# Patient Record
Sex: MALE | Race: WHITE | NOT HISPANIC OR LATINO | Employment: UNEMPLOYED | URBAN - METROPOLITAN AREA
[De-identification: names, ages, dates, MRNs, and addresses within clinical notes are randomized per-mention and may not be internally consistent; named-entity substitution may affect disease eponyms.]

---

## 2017-04-03 RX ORDER — CLOPIDOGREL BISULFATE 75 MG/1
TABLET ORAL
Qty: 30 TABLET | Refills: 2 | OUTPATIENT
Start: 2017-04-03

## 2017-04-03 RX ORDER — ATORVASTATIN CALCIUM 40 MG/1
TABLET, FILM COATED ORAL
Qty: 30 TABLET | Refills: 2 | OUTPATIENT
Start: 2017-04-03

## 2017-04-11 ENCOUNTER — OFFICE VISIT (OUTPATIENT)
Dept: CARDIOLOGY | Facility: CLINIC | Age: 72
End: 2017-04-11

## 2017-04-11 VITALS
WEIGHT: 240 LBS | SYSTOLIC BLOOD PRESSURE: 137 MMHG | BODY MASS INDEX: 32.55 KG/M2 | HEART RATE: 76 BPM | DIASTOLIC BLOOD PRESSURE: 94 MMHG

## 2017-04-11 DIAGNOSIS — R94.31 ABNORMAL ECG: ICD-10-CM

## 2017-04-11 DIAGNOSIS — I25.10 ARTERIOSCLEROSIS OF CORONARY ARTERY: Primary | ICD-10-CM

## 2017-04-11 PROCEDURE — 93000 ELECTROCARDIOGRAM COMPLETE: CPT | Performed by: INTERNAL MEDICINE

## 2017-04-11 PROCEDURE — 99214 OFFICE O/P EST MOD 30 MIN: CPT | Performed by: INTERNAL MEDICINE

## 2017-04-11 RX ORDER — FUROSEMIDE 20 MG/1
20 TABLET ORAL DAILY
Qty: 60 TABLET | Refills: 1 | Status: SHIPPED | OUTPATIENT
Start: 2017-04-11 | End: 2017-08-09 | Stop reason: SDUPTHER

## 2017-04-11 RX ORDER — ATORVASTATIN CALCIUM 40 MG/1
40 TABLET, FILM COATED ORAL DAILY
Qty: 30 TABLET | Refills: 3 | Status: SHIPPED | OUTPATIENT
Start: 2017-04-11 | End: 2017-08-09 | Stop reason: SDUPTHER

## 2017-04-11 RX ORDER — ATORVASTATIN CALCIUM 40 MG/1
TABLET, FILM COATED ORAL
COMMUNITY
Start: 2015-09-08 | End: 2017-04-11

## 2017-04-11 RX ORDER — CLOPIDOGREL BISULFATE 75 MG/1
TABLET ORAL
COMMUNITY
Start: 2014-02-21 | End: 2017-04-11

## 2017-04-11 RX ORDER — LOSARTAN POTASSIUM 50 MG/1
50 TABLET ORAL DAILY
Qty: 60 TABLET | Refills: 2 | Status: SHIPPED | OUTPATIENT
Start: 2017-04-11 | End: 2017-11-10 | Stop reason: SDUPTHER

## 2017-04-11 RX ORDER — CLOPIDOGREL BISULFATE 75 MG/1
75 TABLET ORAL DAILY
Qty: 30 TABLET | Refills: 3 | Status: SHIPPED | OUTPATIENT
Start: 2017-04-11 | End: 2017-08-09 | Stop reason: SDUPTHER

## 2017-04-11 RX ORDER — RAMIPRIL 5 MG/1
CAPSULE ORAL DAILY
COMMUNITY
Start: 2014-03-31 | End: 2017-04-11

## 2017-04-11 NOTE — PROGRESS NOTES
Procedure   [facility]  Cardiology Progress Note    Patient Identification:  Name:Don Verde  Age:72 y.o.  Sex: male  :  1945  MRN: [id number]           2017    Subjective:    Chief Complaint   Patient presents with   • Abnormal ECG       HPI     Mr. Verde is a 72-year-old white male with history of coronary artery disease, underwent a Cardiolite stress test that was equivocal; subsequently underwent cardiac catheterization 2014 showed a long stenosis proximal to mid LAD and underwent a drug-eluting stent implant 2.5 cm Ã--38 cm; also history for diabetes, hypertension, who presents for cardiac follow-up.      Since then, no exertional chest pain. He is physically active. He can walk upto a mile No associated nausea, diaphoresis, radiation or shortness of breath. It was recommended to continue aspirin and Plavix for 1 year.      normal left ventricular size and function. Diastolic dysfunction is present.  He does have some mental disorder due to autism     No new symptoms since the last office visit. He can walk a mile. He does have what appears to be lymphedema of chronic excoriation and thickening of skin in the legs. He doesn't wear compression stockings. I did ask him to follow up with Dr. Barker. No angina.    Review of Systems   Constitution: Negative for chills, fever and malaise/fatigue.   HENT: Negative for headaches.    Eyes: Negative for blurred vision and double vision.   Cardiovascular: Positive for chest pain and leg swelling. Negative for irregular heartbeat and palpitations.   Respiratory: Negative for shortness of breath and snoring.    Skin: Positive for color change.   Musculoskeletal: Positive for arthritis and joint pain.   Gastrointestinal: Positive for vomiting (couple weeks). Negative for abdominal pain and nausea.   Neurological: Positive for light-headedness (if standing to fast). Negative for dizziness.   Psychiatric/Behavioral: Negative for  depression.       The following portions of the patient's history were reviewed and updated as appropriate: allergies, current medications, past family history, past medical history, past social history,and problem list.    Past Medical History:   Diagnosis Date   • Abnormal ECG        Past Surgical History:   Procedure Laterality Date   • APPENDECTOMY     • VEIN SURGERY         Social History     Social History   • Marital status: Single     Spouse name: N/A   • Number of children: N/A   • Years of education: N/A     Occupational History   • Not on file.     Social History Main Topics   • Smoking status: Never Smoker   • Smokeless tobacco: Not on file   • Alcohol use No   • Drug use: Not on file   • Sexual activity: Not on file     Other Topics Concern   • Not on file     Social History Narrative   • No narrative on file       Family History   Problem Relation Age of Onset   • Hypertension Mother    • Heart disease Mother    • Hypertension Father    • Heart disease Father    • Hypertension Maternal Aunt    • Hypertension Maternal Uncle    • Hypertension Paternal Aunt    • Hypertension Paternal Uncle        Scheduled Meds:    Current Outpatient Prescriptions:   •  aspirin 81 MG tablet, Take  by mouth Daily., Disp: , Rfl:   •  atorvastatin (LIPITOR) 40 MG tablet, Take 1 tablet by mouth Daily., Disp: 30 tablet, Rfl: 3  •  clopidogrel (PLAVIX) 75 MG tablet, Take 1 tablet by mouth Daily., Disp: 30 tablet, Rfl: 3    Objective:  /94  Pulse 76  Wt 240 lb (109 kg)  BMI 32.55 kg/m2     Physical Exam  Physical Exam:    General: No acute distress. Poor self care   Skin: Warm and dry, no diaphoresis noted   HEENT: No ptosis; external ear and nose normal; oral mucosa moist   Neck: Supple; no carotid bruits; no JVD, Trachea mid line   Heart: S1S2 regular rate and rhythm; soft systolic murmurs; no gallop or rub appreciated, apex not displaced   Chest: Respirations regular, unlabored at rest, bilateral breath sounds have  good air entry; no  wheezes auscultated.     Abdomen: Soft, non-tender, non-distended, positive bowel sounds  No hepatosplenomegaly   Extremities: Bilateral lower extremities have  pre-tibial pitting edema; lymph edema, left leg is the worst one,Radials are palpable   Neurological: Alert and oriented x 3; no new motor deficits,           ECG 12 Lead  Date/Time: 4/11/2017 2:54 PM  Performed by: ALEXA HINTON  Authorized by: ALEXA HINTON   Rhythm: sinus rhythm  Rate: normal  QRS axis: left             Comparison to previous ECG:  Similar to previous ecg     Assessment:  Problem List Items Addressed This Visit        Cardiovascular and Mediastinum    Arteriosclerosis of coronary artery - Primary    Overview     Description: February 2013, long stenosis proximal to mid left anterior descending artery with a 2.5 cm x38 cm drug-eluting stent.            Other    Abnormal ECG          Plan:    Overall clinically he is doing OK. No angina. His BP is mildly high. I will add a small dose of Lasix and altace. I did tell him to follow up with Dr chou otherwise he may loose his leg.  I did ask him to see his family doctor to have kidney function checked in a month.  I added a small dose of Lasix and Losartan.    I not only counseled the patient today on the risk factor modification of significant factors noted in the assessment and plan, and I also recommended that the patient reduce salt and saturated animal fat intake in diet, about the advantages of plant based diet, as well as to perform scheduled exercise on a regular basis.    04/11/2017  Jaime Hinton MD, FACC

## 2017-08-10 RX ORDER — CLOPIDOGREL BISULFATE 75 MG/1
TABLET ORAL
Qty: 30 TABLET | Refills: 2 | Status: SHIPPED | OUTPATIENT
Start: 2017-08-10 | End: 2017-11-10 | Stop reason: SDUPTHER

## 2017-08-10 RX ORDER — ATORVASTATIN CALCIUM 40 MG/1
TABLET, FILM COATED ORAL
Qty: 30 TABLET | Refills: 2 | Status: SHIPPED | OUTPATIENT
Start: 2017-08-10 | End: 2017-11-10 | Stop reason: SDUPTHER

## 2017-08-10 RX ORDER — FUROSEMIDE 20 MG/1
TABLET ORAL
Qty: 60 TABLET | Refills: 2 | Status: SHIPPED | OUTPATIENT
Start: 2017-08-10 | End: 2018-03-19 | Stop reason: SDUPTHER

## 2017-11-10 RX ORDER — ATORVASTATIN CALCIUM 40 MG/1
40 TABLET, FILM COATED ORAL DAILY
Qty: 30 TABLET | Refills: 3 | Status: SHIPPED | OUTPATIENT
Start: 2017-11-10 | End: 2018-02-13 | Stop reason: SDUPTHER

## 2017-11-10 RX ORDER — CLOPIDOGREL BISULFATE 75 MG/1
75 TABLET ORAL DAILY
Qty: 30 TABLET | Refills: 3 | Status: SHIPPED | OUTPATIENT
Start: 2017-11-10 | End: 2018-03-19 | Stop reason: SDUPTHER

## 2017-11-10 RX ORDER — LOSARTAN POTASSIUM 50 MG/1
50 TABLET ORAL DAILY
Qty: 60 TABLET | Refills: 3 | Status: SHIPPED | OUTPATIENT
Start: 2017-11-10 | End: 2018-04-17 | Stop reason: ALTCHOICE

## 2018-02-13 RX ORDER — ATORVASTATIN CALCIUM 40 MG/1
TABLET, FILM COATED ORAL
Qty: 30 TABLET | Refills: 0 | Status: SHIPPED | OUTPATIENT
Start: 2018-02-13 | End: 2018-03-19 | Stop reason: SDUPTHER

## 2018-03-19 RX ORDER — CLOPIDOGREL BISULFATE 75 MG/1
75 TABLET ORAL DAILY
Qty: 30 TABLET | Refills: 3 | OUTPATIENT
Start: 2018-03-19

## 2018-03-19 RX ORDER — ATORVASTATIN CALCIUM 40 MG/1
TABLET, FILM COATED ORAL
Qty: 30 TABLET | Refills: 0 | OUTPATIENT
Start: 2018-03-19

## 2018-03-19 RX ORDER — FUROSEMIDE 20 MG/1
20 TABLET ORAL DAILY
Qty: 30 TABLET | Refills: 0 | Status: SHIPPED | OUTPATIENT
Start: 2018-03-19 | End: 2018-04-27 | Stop reason: SDUPTHER

## 2018-03-19 RX ORDER — CLOPIDOGREL BISULFATE 75 MG/1
75 TABLET ORAL DAILY
Qty: 30 TABLET | Refills: 0 | Status: SHIPPED | OUTPATIENT
Start: 2018-03-19 | End: 2018-04-17 | Stop reason: ALTCHOICE

## 2018-03-19 RX ORDER — ATORVASTATIN CALCIUM 40 MG/1
40 TABLET, FILM COATED ORAL DAILY
Qty: 30 TABLET | Refills: 0 | Status: SHIPPED | OUTPATIENT
Start: 2018-03-19 | End: 2018-03-31 | Stop reason: SDUPTHER

## 2018-04-02 RX ORDER — ATORVASTATIN CALCIUM 40 MG/1
40 TABLET, FILM COATED ORAL DAILY
Qty: 30 TABLET | Refills: 0 | Status: SHIPPED | OUTPATIENT
Start: 2018-04-02 | End: 2018-04-17

## 2018-04-02 RX ORDER — ATORVASTATIN CALCIUM 40 MG/1
40 TABLET, FILM COATED ORAL DAILY
Qty: 30 TABLET | Refills: 0 | Status: SHIPPED | OUTPATIENT
Start: 2018-04-02 | End: 2018-04-17 | Stop reason: SDUPTHER

## 2018-04-17 ENCOUNTER — OFFICE VISIT (OUTPATIENT)
Dept: CARDIOLOGY | Facility: CLINIC | Age: 73
End: 2018-04-17

## 2018-04-17 VITALS
BODY MASS INDEX: 32.62 KG/M2 | OXYGEN SATURATION: 93 % | HEIGHT: 71 IN | HEART RATE: 87 BPM | SYSTOLIC BLOOD PRESSURE: 161 MMHG | DIASTOLIC BLOOD PRESSURE: 96 MMHG | WEIGHT: 233 LBS

## 2018-04-17 DIAGNOSIS — I25.10 ARTERIOSCLEROSIS OF CORONARY ARTERY: ICD-10-CM

## 2018-04-17 DIAGNOSIS — I10 ESSENTIAL HYPERTENSION: Primary | ICD-10-CM

## 2018-04-17 DIAGNOSIS — R06.02 SHORTNESS OF BREATH: ICD-10-CM

## 2018-04-17 PROCEDURE — 99214 OFFICE O/P EST MOD 30 MIN: CPT | Performed by: INTERNAL MEDICINE

## 2018-04-17 PROCEDURE — 93000 ELECTROCARDIOGRAM COMPLETE: CPT | Performed by: INTERNAL MEDICINE

## 2018-04-17 RX ORDER — AMLODIPINE BESYLATE 5 MG/1
5 TABLET ORAL DAILY
Qty: 90 TABLET | Refills: 3 | Status: SHIPPED | OUTPATIENT
Start: 2018-04-17

## 2018-04-17 RX ORDER — AMLODIPINE BESYLATE 5 MG/1
5 TABLET ORAL DAILY
Qty: 30 TABLET | Refills: 11 | Status: SHIPPED | OUTPATIENT
Start: 2018-04-17 | End: 2018-04-17 | Stop reason: SDUPTHER

## 2018-04-17 NOTE — PROGRESS NOTES
Subjective:       Don Verde is a 73 y.o. male who here for follow up    CC  The follow-up for the coronary artery disease as well as a hypertension  HPI  73-year-old male with known history of coronary atherosclerosis, hypertension, as well as shortness of breath here for the follow-up has a blood pressure running high    Don Verde Jr. has been complaining of the shortness of breath mild-to-moderate in intensity with mild-to-moderate usually relieved with rest associated with anxiety and fatigue       Problem List Items Addressed This Visit        Cardiovascular and Mediastinum    Arteriosclerosis of coronary artery    Essential hypertension - Primary    Relevant Orders    Stress Test With Myocardial Perfusion One Day    Adult Transthoracic Echo Complete W/ Cont if Necessary Per Protocol       Respiratory    Shortness of breath     Relevant Orders    Stress Test With Myocardial Perfusion One Day      Other Visit Diagnoses    None.       .    The following portions of the patient's history were reviewed and updated as appropriate: allergies, current medications, past family history, past medical history, past social history, past surgical history and problem list.    Past Medical History:   Diagnosis Date   • Abnormal ECG     reports that he has never smoked. He does not have any smokeless tobacco history on file. He reports that he does not drink alcohol.  Family History   Problem Relation Age of Onset   • Hypertension Mother    • Heart disease Mother    • Hypertension Father    • Heart disease Father    • Hypertension Maternal Aunt    • Hypertension Maternal Uncle    • Hypertension Paternal Aunt    • Hypertension Paternal Uncle        Review of Systems  Constitutional: No wt loss, fever, fatigue  Gastrointestinal: No nausea, abdominal pain  Behavioral/Psych: No insomnia or anxiety   Cardiovascular No chest pains or tightness in chest  Objective:       Physical Exam           Physical Exam  /96 (BP  "Location: Left arm, Patient Position: Sitting)   Pulse 87   Ht 180.3 cm (71\")   Wt 106 kg (233 lb)   SpO2 93%   BMI 32.50 kg/m²     General appearance: NAD, conversant   Eyes: anicteric sclerae, moist conjunctivae; no lid-lag; PERRLA   HENT: Atraumatic; oropharynx clear with moist mucous membranes and no mucosal ulcerations;  normal hard and soft palate   Neck: Trachea midline; FROM, supple, no thyromegaly or lymphadenopathy   Lungs: CTA, with normal respiratory effort and no intercostal retractions   CV: S1-S2 regular, no murmurs, no rub, no gallop   Abdomen: Soft, non-tender; no masses or HSM   Extremities: No peripheral edema or extremity lymphadenopathy  Skin: Normal temperature, turgor and texture; no rash, ulcers or subcutaneous nodules   Psych: Appropriate affect, alert and oriented to person, place and time           Cardiographics  @  ECG 12 Lead  Date/Time: 4/17/2018 12:11 PM  Performed by: LORETTA SUAREZ  Authorized by: LORETTA SUAREZ   Comparison: compared with previous ECG   Similar to previous ECG  Rhythm: sinus rhythm  ST Flattening: all  Clinical impression: non-specific ECG            Echocardiogram:        Current Outpatient Prescriptions:   •  aspirin 81 MG tablet, Take  by mouth Daily., Disp: , Rfl:   •  atorvastatin (LIPITOR) 40 MG tablet, TAKE 1 TABLET BY MOUTH DAILY., Disp: 30 tablet, Rfl: 0  •  clopidogrel (PLAVIX) 75 MG tablet, Take 1 tablet by mouth Daily., Disp: 30 tablet, Rfl: 0  •  furosemide (LASIX) 20 MG tablet, Take 1 tablet by mouth Daily., Disp: 30 tablet, Rfl: 0   Assessment:        Patient Active Problem List   Diagnosis   • Abnormal ECG   • Arteriosclerosis of coronary artery               Plan:            ICD-10-CM ICD-9-CM   1. Essential hypertension I10 401.9   2. Shortness of breath  R06.02 786.05   3. Arteriosclerosis of coronary artery I25.10 414.00     1. Essential hypertension  Considering the patient's symptoms as well as clinical situation and  EKG " findings, along with cardiac risk factors, ischemic workup is necessary to rule out ischemic cardiomyopathy, stress induced arrhythmias, and functional capacity for diagnosis as well as prognostic consideration    - Stress Test With Myocardial Perfusion One Day  - Adult Transthoracic Echo Complete W/ Cont if Necessary Per Protocol    2. Shortness of breath   Considering the patient's symptoms as well as clinical situation and  EKG findings, along with cardiac risk factors, ischemic workup is necessary to rule out ischemic cardiomyopathy, stress induced arrhythmias, and functional capacity for diagnosis as well as prognostic consideration    Considering patient's medical condition as well as the risk factors, patient will require echocardiogram for further evaluation for the LV function, four-chamber evaluation, including the pressures, valvular function and  pericardial disease and pericardial effusion    - Stress Test With Myocardial Perfusion One Day    3. Arteriosclerosis of coronary artery  Shortness of breath could be angina equivalent     DC PLAVIX    NORVASC  5 MG PO DAILY  Pros and cons of this new medication / change medication has been explained to  the patient    Possible side effects has been explained    Associated need of the blood  Work has been explained    Need for the compliance of the medication has been explained      WALKING LEXISCAN / ECHO    SEE US 2-3 WKS  COUNSELING:    Don Fajardo was given to patient for the following topics: diagnostic results, risk factor reductions, impressions, risks and benefits of treatment options and importance of treatment compliance .       SMOKING COUNSELING:    Counseling given: Not Answered      EMR Dragon/Transcription disclaimer:   Much of this encounter note is an electronic transcription/translation of spoken language to printed text. The electronic translation of spoken language may permit erroneous, or at times, nonsensical words or phrases  to be inadvertently transcribed; Although I have reviewed the note for such errors, some may still exist.

## 2018-04-18 PROBLEM — I10 ESSENTIAL HYPERTENSION: Status: ACTIVE | Noted: 2018-04-18

## 2018-04-18 PROBLEM — R06.02 SHORTNESS OF BREATH: Status: ACTIVE | Noted: 2018-04-18

## 2018-04-18 RX ORDER — ATORVASTATIN CALCIUM 40 MG/1
40 TABLET, FILM COATED ORAL DAILY
Qty: 90 TABLET | Refills: 0 | Status: SHIPPED | OUTPATIENT
Start: 2018-04-18 | End: 2018-05-30

## 2018-04-27 RX ORDER — FUROSEMIDE 20 MG/1
20 TABLET ORAL DAILY
Qty: 30 TABLET | Refills: 0 | Status: SHIPPED | OUTPATIENT
Start: 2018-04-27 | End: 2018-05-30

## 2018-04-27 RX ORDER — CLOPIDOGREL BISULFATE 75 MG/1
75 TABLET ORAL DAILY
Qty: 30 TABLET | Refills: 0 | Status: SHIPPED | OUTPATIENT
Start: 2018-04-27 | End: 2018-06-06 | Stop reason: SDUPTHER

## 2018-05-10 DIAGNOSIS — R06.02 SHORTNESS OF BREATH: Primary | ICD-10-CM

## 2018-05-10 DIAGNOSIS — R94.31 ABNORMAL EKG: ICD-10-CM

## 2018-05-14 ENCOUNTER — APPOINTMENT (OUTPATIENT)
Dept: CARDIOLOGY | Facility: HOSPITAL | Age: 73
End: 2018-05-14
Attending: INTERNAL MEDICINE

## 2018-05-30 ENCOUNTER — HOSPITAL ENCOUNTER (INPATIENT)
Facility: HOSPITAL | Age: 73
LOS: 5 days | Discharge: HOME | End: 2018-06-04
Attending: EMERGENCY MEDICINE | Admitting: HOSPITALIST

## 2018-05-30 ENCOUNTER — APPOINTMENT (OUTPATIENT)
Dept: GENERAL RADIOLOGY | Facility: HOSPITAL | Age: 73
End: 2018-05-30

## 2018-05-30 DIAGNOSIS — L03.116 LEFT LEG CELLULITIS: ICD-10-CM

## 2018-05-30 DIAGNOSIS — Z74.1 SELF-CARE DEFICIT FOR HYGIENE: ICD-10-CM

## 2018-05-30 DIAGNOSIS — F84.5 ASPERGER SYNDROME: ICD-10-CM

## 2018-05-30 DIAGNOSIS — S81.802A OPEN WOUND OF LEFT LOWER LEG, INITIAL ENCOUNTER: Primary | ICD-10-CM

## 2018-05-30 PROBLEM — E11.9 DIABETES MELLITUS (HCC): Chronic | Status: ACTIVE | Noted: 2018-05-30

## 2018-05-30 PROBLEM — L85.9 HYPERKERATOSIS OF SKIN: Chronic | Status: ACTIVE | Noted: 2018-05-30

## 2018-05-30 PROBLEM — I87.2 VENOUS INSUFFICIENCY OF BOTH LOWER EXTREMITIES: Chronic | Status: ACTIVE | Noted: 2018-05-30

## 2018-05-30 LAB
ALBUMIN SERPL-MCNC: 4.1 G/DL (ref 3.5–5.2)
ALBUMIN/GLOB SERPL: 1.3 G/DL
ALP SERPL-CCNC: 106 U/L (ref 39–117)
ALT SERPL W P-5'-P-CCNC: 18 U/L (ref 1–41)
ANION GAP SERPL CALCULATED.3IONS-SCNC: 15.5 MMOL/L
AST SERPL-CCNC: 17 U/L (ref 1–40)
BASOPHILS # BLD AUTO: 0.05 10*3/MM3 (ref 0–0.2)
BASOPHILS NFR BLD AUTO: 0.6 % (ref 0–1.5)
BH CV XLRA MEAS - DIST GSV CALF DIST LEFT: 0.3 CM
BH CV XLRA MEAS - DIST GSV CALF DIST RIGHT: 0.43 CM
BH CV XLRA MEAS - DIST GSV THIGH DIST LEFT: 0.3 CM
BH CV XLRA MEAS - DIST GSV THIGH DIST RIGHT: 0.55 CM
BH CV XLRA MEAS - DIST LSV CALF DIST LEFT: 0.39 CM
BH CV XLRA MEAS - DIST LSV CALF DIST RIGHT: 0.29 CM
BH CV XLRA MEAS - GSV ANKLE DIST LEFT: 0.39 CM
BH CV XLRA MEAS - GSV ANKLE DIST RIGHT: 0.38 CM
BH CV XLRA MEAS - GSV KNEE DIST RIGHT: 0.55 CM
BH CV XLRA MEAS - GSV ORIGIN DIST LEFT: 0.91 CM
BH CV XLRA MEAS - GSV ORIGIN DIST RIGHT: 0.69 CM
BH CV XLRA MEAS - MID GSV CALF LEFT: 0.53 CM
BH CV XLRA MEAS - MID GSV CALF RIGHT: 0.44 CM
BH CV XLRA MEAS - MID GSV THIGH  LEFT: 0.63 CM
BH CV XLRA MEAS - MID GSV THIGH  RIGHT: 0.8 CM
BH CV XLRA MEAS - MID LSV CALF DIST LEFT: 0.46 CM
BH CV XLRA MEAS - MID LSV CALF DIST RIGHT: 0.25 CM
BH CV XLRA MEAS - PROX GSV CALF DIST LEFT: 0.46 CM
BH CV XLRA MEAS - PROX GSV CALF DIST RIGHT: 0.51 CM
BH CV XLRA MEAS - PROX GSV THIGH  LEFT: 1.1 CM
BH CV XLRA MEAS - PROX GSV THIGH  RIGHT: 1.4 CM
BH CV XLRA MEAS - PROX LSV CALF DIST LEFT: 0.39 CM
BH CV XLRA MEAS - PROX LSV CALF DIST RIGHT: 0.48 CM
BILIRUB SERPL-MCNC: 0.6 MG/DL (ref 0.1–1.2)
BUN BLD-MCNC: 15 MG/DL (ref 8–23)
BUN/CREAT SERPL: 13.5 (ref 7–25)
CALCIUM SPEC-SCNC: 9.2 MG/DL (ref 8.6–10.5)
CHLORIDE SERPL-SCNC: 99 MMOL/L (ref 98–107)
CO2 SERPL-SCNC: 23.5 MMOL/L (ref 22–29)
CREAT BLD-MCNC: 1.11 MG/DL (ref 0.76–1.27)
D-LACTATE SERPL-SCNC: 1.8 MMOL/L (ref 0.5–2)
D-LACTATE SERPL-SCNC: 2.4 MMOL/L (ref 0.5–2)
DEPRECATED RDW RBC AUTO: 43.1 FL (ref 37–54)
EOSINOPHIL # BLD AUTO: 0.3 10*3/MM3 (ref 0–0.7)
EOSINOPHIL NFR BLD AUTO: 3.3 % (ref 0.3–6.2)
ERYTHROCYTE [DISTWIDTH] IN BLOOD BY AUTOMATED COUNT: 13.8 % (ref 11.5–14.5)
GFR SERPL CREATININE-BSD FRML MDRD: 65 ML/MIN/1.73
GLOBULIN UR ELPH-MCNC: 3.2 GM/DL
GLUCOSE BLD-MCNC: 152 MG/DL (ref 65–99)
GLUCOSE BLDC GLUCOMTR-MCNC: 136 MG/DL (ref 70–130)
GLUCOSE BLDC GLUCOMTR-MCNC: 160 MG/DL (ref 70–130)
GLUCOSE BLDC GLUCOMTR-MCNC: 168 MG/DL (ref 70–130)
HCT VFR BLD AUTO: 44.4 % (ref 40.4–52.2)
HGB BLD-MCNC: 14.5 G/DL (ref 13.7–17.6)
HOLD SPECIMEN: NORMAL
IMM GRANULOCYTES # BLD: 0.01 10*3/MM3 (ref 0–0.03)
IMM GRANULOCYTES NFR BLD: 0.1 % (ref 0–0.5)
INR PPP: 1.04 (ref 0.9–1.1)
LYMPHOCYTES # BLD AUTO: 1.42 10*3/MM3 (ref 0.9–4.8)
LYMPHOCYTES NFR BLD AUTO: 15.8 % (ref 19.6–45.3)
MCH RBC QN AUTO: 28 PG (ref 27–32.7)
MCHC RBC AUTO-ENTMCNC: 32.7 G/DL (ref 32.6–36.4)
MCV RBC AUTO: 85.7 FL (ref 79.8–96.2)
MONOCYTES # BLD AUTO: 0.68 10*3/MM3 (ref 0.2–1.2)
MONOCYTES NFR BLD AUTO: 7.6 % (ref 5–12)
NEUTROPHILS # BLD AUTO: 6.53 10*3/MM3 (ref 1.9–8.1)
NEUTROPHILS NFR BLD AUTO: 72.7 % (ref 42.7–76)
NRBC BLD MANUAL-RTO: 0 /100 WBC (ref 0–0)
NT-PROBNP SERPL-MCNC: 131.6 PG/ML (ref 0–900)
PLATELET # BLD AUTO: 385 10*3/MM3 (ref 140–500)
PMV BLD AUTO: 9.9 FL (ref 6–12)
POTASSIUM BLD-SCNC: 4.1 MMOL/L (ref 3.5–5.2)
PROCALCITONIN SERPL-MCNC: 0.02 NG/ML (ref 0.1–0.25)
PROT SERPL-MCNC: 7.3 G/DL (ref 6–8.5)
PROTHROMBIN TIME: 13.4 SECONDS (ref 11.7–14.2)
RBC # BLD AUTO: 5.18 10*6/MM3 (ref 4.6–6)
SODIUM BLD-SCNC: 138 MMOL/L (ref 136–145)
TROPONIN T SERPL-MCNC: <0.01 NG/ML (ref 0–0.03)
WBC NRBC COR # BLD: 8.98 10*3/MM3 (ref 4.5–10.7)

## 2018-05-30 PROCEDURE — 83605 ASSAY OF LACTIC ACID: CPT | Performed by: NURSE PRACTITIONER

## 2018-05-30 PROCEDURE — 84145 PROCALCITONIN (PCT): CPT | Performed by: NURSE PRACTITIONER

## 2018-05-30 PROCEDURE — 25010000002 PIPERACILLIN SOD-TAZOBACTAM PER 1 G: Performed by: NURSE PRACTITIONER

## 2018-05-30 PROCEDURE — 87040 BLOOD CULTURE FOR BACTERIA: CPT | Performed by: NURSE PRACTITIONER

## 2018-05-30 PROCEDURE — 36415 COLL VENOUS BLD VENIPUNCTURE: CPT

## 2018-05-30 PROCEDURE — 36415 COLL VENOUS BLD VENIPUNCTURE: CPT | Performed by: NURSE PRACTITIONER

## 2018-05-30 PROCEDURE — 93005 ELECTROCARDIOGRAM TRACING: CPT | Performed by: NURSE PRACTITIONER

## 2018-05-30 PROCEDURE — 82962 GLUCOSE BLOOD TEST: CPT

## 2018-05-30 PROCEDURE — 25010000002 VANCOMYCIN: Performed by: NURSE PRACTITIONER

## 2018-05-30 PROCEDURE — 80053 COMPREHEN METABOLIC PANEL: CPT | Performed by: NURSE PRACTITIONER

## 2018-05-30 PROCEDURE — 99285 EMERGENCY DEPT VISIT HI MDM: CPT

## 2018-05-30 PROCEDURE — 85610 PROTHROMBIN TIME: CPT | Performed by: NURSE PRACTITIONER

## 2018-05-30 PROCEDURE — 85025 COMPLETE CBC W/AUTO DIFF WBC: CPT | Performed by: NURSE PRACTITIONER

## 2018-05-30 PROCEDURE — 93010 ELECTROCARDIOGRAM REPORT: CPT | Performed by: INTERNAL MEDICINE

## 2018-05-30 PROCEDURE — 83880 ASSAY OF NATRIURETIC PEPTIDE: CPT | Performed by: NURSE PRACTITIONER

## 2018-05-30 PROCEDURE — 71046 X-RAY EXAM CHEST 2 VIEWS: CPT

## 2018-05-30 PROCEDURE — 84484 ASSAY OF TROPONIN QUANT: CPT | Performed by: NURSE PRACTITIONER

## 2018-05-30 RX ORDER — NALOXONE HCL 0.4 MG/ML
0.4 VIAL (ML) INJECTION
Status: DISCONTINUED | OUTPATIENT
Start: 2018-05-30 | End: 2018-06-04 | Stop reason: HOSPADM

## 2018-05-30 RX ORDER — SODIUM CHLORIDE 0.9 % (FLUSH) 0.9 %
1-10 SYRINGE (ML) INJECTION AS NEEDED
Status: DISCONTINUED | OUTPATIENT
Start: 2018-05-30 | End: 2018-06-04 | Stop reason: HOSPADM

## 2018-05-30 RX ORDER — ACETAMINOPHEN 325 MG/1
650 TABLET ORAL EVERY 6 HOURS PRN
Status: DISCONTINUED | OUTPATIENT
Start: 2018-05-30 | End: 2018-06-04 | Stop reason: HOSPADM

## 2018-05-30 RX ORDER — ATORVASTATIN CALCIUM 40 MG/1
40 TABLET, FILM COATED ORAL DAILY
COMMUNITY

## 2018-05-30 RX ORDER — CALCIUM CARBONATE 200(500)MG
1 TABLET,CHEWABLE ORAL 2 TIMES DAILY PRN
Status: DISCONTINUED | OUTPATIENT
Start: 2018-05-30 | End: 2018-06-04 | Stop reason: HOSPADM

## 2018-05-30 RX ORDER — BISACODYL 10 MG
10 SUPPOSITORY, RECTAL RECTAL DAILY PRN
Status: DISCONTINUED | OUTPATIENT
Start: 2018-05-30 | End: 2018-06-04 | Stop reason: HOSPADM

## 2018-05-30 RX ORDER — FUROSEMIDE 20 MG/1
20 TABLET ORAL DAILY
Status: DISCONTINUED | OUTPATIENT
Start: 2018-05-31 | End: 2018-06-04 | Stop reason: HOSPADM

## 2018-05-30 RX ORDER — HYDROCODONE BITARTRATE AND ACETAMINOPHEN 5; 325 MG/1; MG/1
1 TABLET ORAL EVERY 6 HOURS PRN
Status: DISCONTINUED | OUTPATIENT
Start: 2018-05-30 | End: 2018-05-30

## 2018-05-30 RX ORDER — ONDANSETRON 4 MG/1
4 TABLET, FILM COATED ORAL EVERY 6 HOURS PRN
Status: DISCONTINUED | OUTPATIENT
Start: 2018-05-30 | End: 2018-06-04 | Stop reason: HOSPADM

## 2018-05-30 RX ORDER — AMLODIPINE BESYLATE 5 MG/1
5 TABLET ORAL DAILY
Status: DISCONTINUED | OUTPATIENT
Start: 2018-05-31 | End: 2018-06-04 | Stop reason: HOSPADM

## 2018-05-30 RX ORDER — LOSARTAN POTASSIUM 50 MG/1
50 TABLET ORAL DAILY
COMMUNITY
End: 2018-06-04 | Stop reason: HOSPADM

## 2018-05-30 RX ORDER — FUROSEMIDE 20 MG/1
20 TABLET ORAL DAILY
COMMUNITY

## 2018-05-30 RX ORDER — NICOTINE POLACRILEX 4 MG
15 LOZENGE BUCCAL
Status: DISCONTINUED | OUTPATIENT
Start: 2018-05-30 | End: 2018-06-04 | Stop reason: HOSPADM

## 2018-05-30 RX ORDER — VIT E ACET/GLY/DIMETH/WATER
LOTION (ML) TOPICAL EVERY 12 HOURS SCHEDULED
Status: DISCONTINUED | OUTPATIENT
Start: 2018-05-30 | End: 2018-06-04 | Stop reason: HOSPADM

## 2018-05-30 RX ORDER — ONDANSETRON 2 MG/ML
4 INJECTION INTRAMUSCULAR; INTRAVENOUS EVERY 6 HOURS PRN
Status: DISCONTINUED | OUTPATIENT
Start: 2018-05-30 | End: 2018-06-04 | Stop reason: HOSPADM

## 2018-05-30 RX ORDER — ONDANSETRON 4 MG/1
4 TABLET, ORALLY DISINTEGRATING ORAL EVERY 6 HOURS PRN
Status: DISCONTINUED | OUTPATIENT
Start: 2018-05-30 | End: 2018-06-04 | Stop reason: HOSPADM

## 2018-05-30 RX ORDER — HYDROCODONE BITARTRATE AND ACETAMINOPHEN 5; 325 MG/1; MG/1
1 TABLET ORAL EVERY 4 HOURS PRN
Status: DISCONTINUED | OUTPATIENT
Start: 2018-05-30 | End: 2018-06-04 | Stop reason: HOSPADM

## 2018-05-30 RX ORDER — ATORVASTATIN CALCIUM 20 MG/1
40 TABLET, FILM COATED ORAL DAILY
Status: DISCONTINUED | OUTPATIENT
Start: 2018-05-30 | End: 2018-06-04 | Stop reason: HOSPADM

## 2018-05-30 RX ORDER — DEXTROSE MONOHYDRATE 25 G/50ML
25 INJECTION, SOLUTION INTRAVENOUS
Status: DISCONTINUED | OUTPATIENT
Start: 2018-05-30 | End: 2018-06-04 | Stop reason: HOSPADM

## 2018-05-30 RX ORDER — SODIUM CHLORIDE 0.9 % (FLUSH) 0.9 %
10 SYRINGE (ML) INJECTION AS NEEDED
Status: DISCONTINUED | OUTPATIENT
Start: 2018-05-30 | End: 2018-06-04 | Stop reason: HOSPADM

## 2018-05-30 RX ADMIN — ATORVASTATIN CALCIUM 40 MG: 20 TABLET, FILM COATED ORAL at 19:45

## 2018-05-30 RX ADMIN — SODIUM CHLORIDE 1000 ML: 9 INJECTION, SOLUTION INTRAVENOUS at 13:05

## 2018-05-30 RX ADMIN — TAZOBACTAM SODIUM AND PIPERACILLIN SODIUM 3.38 G: 375; 3 INJECTION, SOLUTION INTRAVENOUS at 13:44

## 2018-05-30 RX ADMIN — HYDROCODONE BITARTRATE AND ACETAMINOPHEN 1 TABLET: 5; 325 TABLET ORAL at 13:50

## 2018-05-30 RX ADMIN — VANCOMYCIN HYDROCHLORIDE 2250 MG: 1 INJECTION, POWDER, LYOPHILIZED, FOR SOLUTION INTRAVENOUS at 14:15

## 2018-05-30 RX ADMIN — EMOLLIENT - LOTION: LOTION at 23:12

## 2018-05-31 ENCOUNTER — APPOINTMENT (OUTPATIENT)
Dept: CARDIOLOGY | Facility: HOSPITAL | Age: 73
End: 2018-05-31
Attending: SURGERY

## 2018-05-31 LAB
ANION GAP SERPL CALCULATED.3IONS-SCNC: 15.2 MMOL/L
BASOPHILS # BLD AUTO: 0.06 10*3/MM3 (ref 0–0.2)
BASOPHILS NFR BLD AUTO: 0.6 % (ref 0–1.5)
BH CV LOWER ARTERIAL LEFT ABI RATIO: 0.83
BH CV LOWER ARTERIAL LEFT DORSALIS PEDIS SYS MAX: 95 MMHG
BH CV LOWER ARTERIAL LEFT GREAT TOE SYS MAX: 67 MMHG
BH CV LOWER ARTERIAL LEFT POST TIBIAL SYS MAX: 102 MMHG
BH CV LOWER ARTERIAL LEFT TBI RATIO: 0.54
BH CV LOWER ARTERIAL RIGHT ABI RATIO: 0.85
BH CV LOWER ARTERIAL RIGHT DORSALIS PEDIS SYS MAX: 100 MMHG
BH CV LOWER ARTERIAL RIGHT GREAT TOE SYS MAX: 74 MMHG
BH CV LOWER ARTERIAL RIGHT POST TIBIAL SYS MAX: 105 MMHG
BH CV LOWER ARTERIAL RIGHT TBI RATIO: 0.6
BUN BLD-MCNC: 13 MG/DL (ref 8–23)
BUN/CREAT SERPL: 10.3 (ref 7–25)
CALCIUM SPEC-SCNC: 9 MG/DL (ref 8.6–10.5)
CHLORIDE SERPL-SCNC: 100 MMOL/L (ref 98–107)
CO2 SERPL-SCNC: 25.8 MMOL/L (ref 22–29)
CREAT BLD-MCNC: 1.26 MG/DL (ref 0.76–1.27)
DEPRECATED RDW RBC AUTO: 44.8 FL (ref 37–54)
EOSINOPHIL # BLD AUTO: 0.39 10*3/MM3 (ref 0–0.7)
EOSINOPHIL NFR BLD AUTO: 4 % (ref 0.3–6.2)
ERYTHROCYTE [DISTWIDTH] IN BLOOD BY AUTOMATED COUNT: 14 % (ref 11.5–14.5)
GFR SERPL CREATININE-BSD FRML MDRD: 56 ML/MIN/1.73
GLUCOSE BLD-MCNC: 137 MG/DL (ref 65–99)
GLUCOSE BLDC GLUCOMTR-MCNC: 122 MG/DL (ref 70–130)
GLUCOSE BLDC GLUCOMTR-MCNC: 139 MG/DL (ref 70–130)
GLUCOSE BLDC GLUCOMTR-MCNC: 143 MG/DL (ref 70–130)
HCT VFR BLD AUTO: 42.2 % (ref 40.4–52.2)
HGB BLD-MCNC: 13.5 G/DL (ref 13.7–17.6)
IMM GRANULOCYTES # BLD: 0.02 10*3/MM3 (ref 0–0.03)
IMM GRANULOCYTES NFR BLD: 0.2 % (ref 0–0.5)
LYMPHOCYTES # BLD AUTO: 2.4 10*3/MM3 (ref 0.9–4.8)
LYMPHOCYTES NFR BLD AUTO: 24.3 % (ref 19.6–45.3)
MAGNESIUM SERPL-MCNC: 2.4 MG/DL (ref 1.6–2.4)
MCH RBC QN AUTO: 28 PG (ref 27–32.7)
MCHC RBC AUTO-ENTMCNC: 32 G/DL (ref 32.6–36.4)
MCV RBC AUTO: 87.6 FL (ref 79.8–96.2)
MONOCYTES # BLD AUTO: 0.97 10*3/MM3 (ref 0.2–1.2)
MONOCYTES NFR BLD AUTO: 9.8 % (ref 5–12)
NEUTROPHILS # BLD AUTO: 6.05 10*3/MM3 (ref 1.9–8.1)
NEUTROPHILS NFR BLD AUTO: 61.3 % (ref 42.7–76)
PHOSPHATE SERPL-MCNC: 2.9 MG/DL (ref 2.5–4.5)
PLATELET # BLD AUTO: 357 10*3/MM3 (ref 140–500)
PMV BLD AUTO: 9.8 FL (ref 6–12)
POTASSIUM BLD-SCNC: 4.4 MMOL/L (ref 3.5–5.2)
RBC # BLD AUTO: 4.82 10*6/MM3 (ref 4.6–6)
SODIUM BLD-SCNC: 141 MMOL/L (ref 136–145)
TSH SERPL DL<=0.05 MIU/L-ACNC: 3.4 MIU/ML (ref 0.27–4.2)
UPPER ARTERIAL LEFT ARM BRACHIAL SYS MAX: 122 MMHG
UPPER ARTERIAL RIGHT ARM BRACHIAL SYS MAX: 123 MMHG
WBC NRBC COR # BLD: 9.87 10*3/MM3 (ref 4.5–10.7)

## 2018-05-31 PROCEDURE — 97165 OT EVAL LOW COMPLEX 30 MIN: CPT

## 2018-05-31 PROCEDURE — 25010000002 VANCOMYCIN 10 G RECONSTITUTED SOLUTION: Performed by: INTERNAL MEDICINE

## 2018-05-31 PROCEDURE — 80048 BASIC METABOLIC PNL TOTAL CA: CPT | Performed by: INTERNAL MEDICINE

## 2018-05-31 PROCEDURE — 25010000002 ENOXAPARIN PER 10 MG: Performed by: INTERNAL MEDICINE

## 2018-05-31 PROCEDURE — 83735 ASSAY OF MAGNESIUM: CPT | Performed by: INTERNAL MEDICINE

## 2018-05-31 PROCEDURE — 85025 COMPLETE CBC W/AUTO DIFF WBC: CPT | Performed by: INTERNAL MEDICINE

## 2018-05-31 PROCEDURE — 25010000002 PIPERACILLIN SOD-TAZOBACTAM PER 1 G: Performed by: INTERNAL MEDICINE

## 2018-05-31 PROCEDURE — G8987 SELF CARE CURRENT STATUS: HCPCS

## 2018-05-31 PROCEDURE — 97140 MANUAL THERAPY 1/> REGIONS: CPT

## 2018-05-31 PROCEDURE — 93922 UPR/L XTREMITY ART 2 LEVELS: CPT

## 2018-05-31 PROCEDURE — 84443 ASSAY THYROID STIM HORMONE: CPT | Performed by: INTERNAL MEDICINE

## 2018-05-31 PROCEDURE — G8988 SELF CARE GOAL STATUS: HCPCS

## 2018-05-31 PROCEDURE — 82962 GLUCOSE BLOOD TEST: CPT

## 2018-05-31 PROCEDURE — 84100 ASSAY OF PHOSPHORUS: CPT | Performed by: INTERNAL MEDICINE

## 2018-05-31 PROCEDURE — 93970 EXTREMITY STUDY: CPT

## 2018-05-31 PROCEDURE — G8989 SELF CARE D/C STATUS: HCPCS

## 2018-05-31 RX ADMIN — ENOXAPARIN SODIUM 40 MG: 40 INJECTION SUBCUTANEOUS at 20:29

## 2018-05-31 RX ADMIN — VANCOMYCIN HYDROCHLORIDE 1500 MG: 10 INJECTION, POWDER, LYOPHILIZED, FOR SOLUTION INTRAVENOUS at 02:58

## 2018-05-31 RX ADMIN — AMLODIPINE BESYLATE 5 MG: 5 TABLET ORAL at 10:04

## 2018-05-31 RX ADMIN — EMOLLIENT - LOTION: LOTION at 20:30

## 2018-05-31 RX ADMIN — TAZOBACTAM SODIUM AND PIPERACILLIN SODIUM 3.38 G: 375; 3 INJECTION, SOLUTION INTRAVENOUS at 20:29

## 2018-05-31 RX ADMIN — ATORVASTATIN CALCIUM 40 MG: 20 TABLET, FILM COATED ORAL at 10:04

## 2018-05-31 RX ADMIN — FUROSEMIDE 20 MG: 20 TABLET ORAL at 10:05

## 2018-05-31 RX ADMIN — EMOLLIENT - LOTION: LOTION at 10:07

## 2018-05-31 RX ADMIN — VANCOMYCIN HYDROCHLORIDE 1500 MG: 10 INJECTION, POWDER, LYOPHILIZED, FOR SOLUTION INTRAVENOUS at 15:28

## 2018-06-01 LAB
ANION GAP SERPL CALCULATED.3IONS-SCNC: 9.9 MMOL/L
BASOPHILS # BLD AUTO: 0.04 10*3/MM3 (ref 0–0.2)
BASOPHILS NFR BLD AUTO: 0.5 % (ref 0–1.5)
BUN BLD-MCNC: 15 MG/DL (ref 8–23)
BUN/CREAT SERPL: 11.8 (ref 7–25)
CALCIUM SPEC-SCNC: 8.6 MG/DL (ref 8.6–10.5)
CHLORIDE SERPL-SCNC: 103 MMOL/L (ref 98–107)
CO2 SERPL-SCNC: 26.1 MMOL/L (ref 22–29)
CREAT BLD-MCNC: 1.27 MG/DL (ref 0.76–1.27)
DEPRECATED RDW RBC AUTO: 44 FL (ref 37–54)
EOSINOPHIL # BLD AUTO: 0.32 10*3/MM3 (ref 0–0.7)
EOSINOPHIL NFR BLD AUTO: 3.8 % (ref 0.3–6.2)
ERYTHROCYTE [DISTWIDTH] IN BLOOD BY AUTOMATED COUNT: 13.8 % (ref 11.5–14.5)
GFR SERPL CREATININE-BSD FRML MDRD: 56 ML/MIN/1.73
GLUCOSE BLD-MCNC: 134 MG/DL (ref 65–99)
GLUCOSE BLDC GLUCOMTR-MCNC: 127 MG/DL (ref 70–130)
GLUCOSE BLDC GLUCOMTR-MCNC: 141 MG/DL (ref 70–130)
GLUCOSE BLDC GLUCOMTR-MCNC: 148 MG/DL (ref 70–130)
GLUCOSE BLDC GLUCOMTR-MCNC: 157 MG/DL (ref 70–130)
HCT VFR BLD AUTO: 38.6 % (ref 40.4–52.2)
HGB BLD-MCNC: 12.9 G/DL (ref 13.7–17.6)
IMM GRANULOCYTES # BLD: 0.02 10*3/MM3 (ref 0–0.03)
IMM GRANULOCYTES NFR BLD: 0.2 % (ref 0–0.5)
LYMPHOCYTES # BLD AUTO: 1.86 10*3/MM3 (ref 0.9–4.8)
LYMPHOCYTES NFR BLD AUTO: 22.1 % (ref 19.6–45.3)
MCH RBC QN AUTO: 29 PG (ref 27–32.7)
MCHC RBC AUTO-ENTMCNC: 33.4 G/DL (ref 32.6–36.4)
MCV RBC AUTO: 86.7 FL (ref 79.8–96.2)
MONOCYTES # BLD AUTO: 0.67 10*3/MM3 (ref 0.2–1.2)
MONOCYTES NFR BLD AUTO: 8 % (ref 5–12)
NEUTROPHILS # BLD AUTO: 5.51 10*3/MM3 (ref 1.9–8.1)
NEUTROPHILS NFR BLD AUTO: 65.6 % (ref 42.7–76)
PLATELET # BLD AUTO: 297 10*3/MM3 (ref 140–500)
PMV BLD AUTO: 9.5 FL (ref 6–12)
POTASSIUM BLD-SCNC: 4 MMOL/L (ref 3.5–5.2)
RBC # BLD AUTO: 4.45 10*6/MM3 (ref 4.6–6)
SODIUM BLD-SCNC: 139 MMOL/L (ref 136–145)
VANCOMYCIN TROUGH SERPL-MCNC: 19.8 MCG/ML (ref 5–20)
WBC NRBC COR # BLD: 8.4 10*3/MM3 (ref 4.5–10.7)

## 2018-06-01 PROCEDURE — 80048 BASIC METABOLIC PNL TOTAL CA: CPT | Performed by: INTERNAL MEDICINE

## 2018-06-01 PROCEDURE — 97140 MANUAL THERAPY 1/> REGIONS: CPT

## 2018-06-01 PROCEDURE — 25010000002 PIPERACILLIN SOD-TAZOBACTAM PER 1 G: Performed by: INTERNAL MEDICINE

## 2018-06-01 PROCEDURE — 82962 GLUCOSE BLOOD TEST: CPT

## 2018-06-01 PROCEDURE — 80202 ASSAY OF VANCOMYCIN: CPT | Performed by: INTERNAL MEDICINE

## 2018-06-01 PROCEDURE — 25010000002 ENOXAPARIN PER 10 MG: Performed by: INTERNAL MEDICINE

## 2018-06-01 PROCEDURE — 85025 COMPLETE CBC W/AUTO DIFF WBC: CPT | Performed by: INTERNAL MEDICINE

## 2018-06-01 PROCEDURE — 97110 THERAPEUTIC EXERCISES: CPT

## 2018-06-01 PROCEDURE — 3E033TZ INTRODUCTION OF DESTRUCTIVE AGENT INTO PERIPHERAL VEIN, PERCUTANEOUS APPROACH: ICD-10-PCS | Performed by: SURGERY

## 2018-06-01 PROCEDURE — 97161 PT EVAL LOW COMPLEX 20 MIN: CPT

## 2018-06-01 PROCEDURE — 25010000002 VANCOMYCIN 10 G RECONSTITUTED SOLUTION: Performed by: INTERNAL MEDICINE

## 2018-06-01 RX ADMIN — TAZOBACTAM SODIUM AND PIPERACILLIN SODIUM 3.38 G: 375; 3 INJECTION, SOLUTION INTRAVENOUS at 21:27

## 2018-06-01 RX ADMIN — VANCOMYCIN HYDROCHLORIDE 1500 MG: 10 INJECTION, POWDER, LYOPHILIZED, FOR SOLUTION INTRAVENOUS at 03:14

## 2018-06-01 RX ADMIN — TAZOBACTAM SODIUM AND PIPERACILLIN SODIUM 3.38 G: 375; 3 INJECTION, SOLUTION INTRAVENOUS at 15:38

## 2018-06-01 RX ADMIN — HYDROCODONE BITARTRATE AND ACETAMINOPHEN 1 TABLET: 5; 325 TABLET ORAL at 21:27

## 2018-06-01 RX ADMIN — ENOXAPARIN SODIUM 40 MG: 40 INJECTION SUBCUTANEOUS at 21:27

## 2018-06-01 RX ADMIN — FUROSEMIDE 20 MG: 20 TABLET ORAL at 09:17

## 2018-06-01 RX ADMIN — AMLODIPINE BESYLATE 5 MG: 5 TABLET ORAL at 09:17

## 2018-06-01 RX ADMIN — EMOLLIENT - LOTION: LOTION at 09:57

## 2018-06-01 RX ADMIN — ATORVASTATIN CALCIUM 40 MG: 20 TABLET, FILM COATED ORAL at 09:17

## 2018-06-01 RX ADMIN — TAZOBACTAM SODIUM AND PIPERACILLIN SODIUM 3.38 G: 375; 3 INJECTION, SOLUTION INTRAVENOUS at 06:21

## 2018-06-01 NOTE — PROGRESS NOTES
"Pharmacokinetic Evaluation - Vancomycin    Don Verde Jr. is a 73 y.o. male on vancomycin pharmacy to dose.  MRN: [id number]  : 1945    Day of vancomycin therapy: 3  Indication: Skin/Soft tissue infection  Consulted by: Dr. Watson  Goal trough: 15-20    Current dose: 1500 mg IV Q12  Other antimicrobials: Zosyn 3.375    Blood pressure 115/74, pulse 54, temperature 97.3 °F (36.3 °C), temperature source Oral, resp. rate 16, height 180.3 cm (71\"), weight 108 kg (239 lb), SpO2 93 %.    Results from last 7 days  Lab Units 18  0556 18  0750 18  1236   CREATININE mg/dL 1.27 1.26 1.11     Estimated Creatinine Clearance: 64.8 mL/min (by C-G formula based on SCr of 1.27 mg/dL).    Results from last 7 days  Lab Units 18  0556 18  0750 18  1236   WBC 10*3/mm3 8.40 9.87 8.98   HEMOGLOBIN g/dL 12.9* 13.5* 14.5   HEMATOCRIT % 38.6* 42.2 44.4   PLATELETS 10*3/mm3 297 357 385     Other relevant labs/chart info: Pt admitted with bleeding R foot; seen by vascular and underwent sclerosis on      Cultures:   : Blood cx NGTD    Dosing hx (include troughs if drawn):  : Vancomycin 2250 mg load given at 1415  : Vancomycin 1500 mg q12 maintenance dose started at 0258  : Vancomycin trough = 19.8 at 1329.     Assessment:  Level is at the upper end of the therapeutic range - drawn after only 3 doses - concerned may continue to accumulate. Will decrease to 1250 q12 given level and age. Renal function with slight increase.    Plan:  1) Decrease to  vancomycin 1250 mg every 12 hours.  2) Next trough on  at 0130 after 4 total doses.  3) Monitor for s/sxns of vancomycin toxicity including changes in UOP/SCr; encourage hydration as tolerated to decrease risk of toxic accumulation. If SCr continues to rise tomorrow, may need to adjust levels/dosing further    Nanette De Paz Pharm.D., [facility]  Oncology Pharmacy Specialist  [phone]      "

## 2018-06-01 NOTE — PROGRESS NOTES
"    DAILY PROGRESS NOTE  [facility]    Patient Identification:  Name: Don Verde Jr.  Age: 73 y.o.  Sex: male  :  1945  MRN: [id number]         Primary Care Physician: No Known Provider    Subjective:  Interval History: no new issues - no cp/sob    Objective:no fm     Scheduled Meds:  amLODIPine 5 mg Oral Daily   atorvastatin 40 mg Oral Daily   cetaphil  Topical Q12H   enoxaparin 40 mg Subcutaneous Nightly   furosemide 20 mg Oral Daily   insulin aspart 0-7 Units Subcutaneous 4x Daily With Meals & Nightly   piperacillin-tazobactam 3.375 g Intravenous Q8H   [START ON 2018] vancomycin 1,250 mg Intravenous Q12H     Continuous Infusions:  Pharmacy to dose vancomycin    Pharmacy to Dose Zosyn        Vital signs in last 24 hours:  Temp:  [97.3 °F (36.3 °C)-98.9 °F (37.2 °C)] 97.3 °F (36.3 °C)  Heart Rate:  [54-59] 54  Resp:  [16] 16  BP: ()/(49-74) 115/74    Intake/Output:    Intake/Output Summary (Last 24 hours) at 18 1612  Last data filed at 18 0910   Gross per 24 hour   Intake             1200 ml   Output              700 ml   Net              500 ml       Exam:  /74 (BP Location: Right arm, Patient Position: Lying)   Pulse 54   Temp 97.3 °F (36.3 °C) (Oral)   Resp 16   Ht 180.3 cm (71\")   Wt 108 kg (239 lb)   SpO2 93%   BMI 33.33 kg/m²     General Appearance:    Alert, cooperative, no distress, AAOx3                         Throat:   Lips, tongue, gums normal; oral mucosa pink and moist                         Lungs:    Clear to auscultation bilaterally, respirations unlabored                         Heart:    Regular rate and rhythm, S1 and S2 normal, no murmur                  Abdomen:     Soft, non-tender, bowel sounds active                 Extremities:   Now w/ LE wraps                       Data Review:  Labs in chart were reviewed.    Assessment:  Active Hospital Problems (** Indicates Principal Problem)    Diagnosis Date Noted   • Open wound of left " lower leg [S81.802A] 05/30/2018   • Venous insufficiency of both lower extremities [I87.2] 05/30/2018   • Diabetes mellitus [E11.9] 05/30/2018   • Asperger's syndrome [F84.5] 05/30/2018   • Hyperkeratosis of skin [L85.9] 05/30/2018   • Cellulitis of left lower extremity [L03.116] 05/30/2018   • Essential hypertension [I10] 04/18/2018      Resolved Hospital Problems    Diagnosis Date Noted Date Resolved   No resolved problems to display.       Plan:  D3 Vanc/Zosyn - Vascular notes reviewed and appreciate ALL - remainder of Tx can be completed as outpt but we have CCP logistic issues to coordinate/complete before weekend       HTN - stable      DM2 - ssi      Asperger's       lovenox for DVT ppx     Juan Miguel Rowe MD  6/1/2018  4:12 PM

## 2018-06-01 NOTE — DISCHARGE INSTR - APPOINTMENTS
[facility] has agreed to see patient  on Thursday, June 7 at home. They will call  30 minutes before they arrive. Physician is Dr. Benz. Per [place], if patient is discharged over the weekend, call [phone] for home health orders or other needs..

## 2018-06-01 NOTE — NURSING NOTE
S/W Podiatrist Marilyn Jaimes MD, and he states that he doesn't practice here and will see the patient outpatient. Called  and confrimed that there is no podiatry group that sees inpatient at [facility]. Call placed to Dr. Hearn.

## 2018-06-01 NOTE — OP NOTE
DOS:    PREOP:  Bleeding ulcer right medial malleolus    POSTOP:  Same    PROC:  Injection sclerotx single vein right medial malleolus    IND:  Bilateral CEAP 6, bleeding ulcer Right, chr VSU Left  Risk of bleeding, infection, dvt discussed    PROC:  Isopropyl alcohol prep to right medial malleolus.  0.5cc of  0.5% liquid Polidocenol used used to sclerose the varicosity under the bleeding ulcer.  A 30g needle was placed, confirmed intravenous position and a slow , low pressure injection followed.  4x4, kerlix, ACE applied.  Tolerated well.

## 2018-06-01 NOTE — PLAN OF CARE
Problem: Patient Care Overview  Goal: Plan of Care Review  Outcome: Ongoing (interventions implemented as appropriate)   06/01/18 3610   Coping/Psychosocial   Plan of Care Reviewed With patient   Plan of Care Review   Progress no change   OTHER   Outcome Summary Patient denies pain or discomfort. Patient a bit agitated about bed alarm; however, he was educated on reasoning behind safety concerns. OT followed and rewrapped BLE. RN observed dressing change to LLE - please see OT's note. Patient will need to follow up with podiatry and vascular in outpatient. Will continue to monitor.      Goal: Individualization and Mutuality  Outcome: Ongoing (interventions implemented as appropriate)    Goal: Discharge Needs Assessment  Outcome: Ongoing (interventions implemented as appropriate)    Goal: Interprofessional Rounds/Family Conf  Outcome: Ongoing (interventions implemented as appropriate)      Problem: Skin and Soft Tissue Infection (Adult)  Goal: Signs and Symptoms of Listed Potential Problems Will be Absent, Minimized or Managed (Skin and Soft Tissue Infection)  Outcome: Ongoing (interventions implemented as appropriate)

## 2018-06-01 NOTE — PROGRESS NOTES
Jaja Hearn MD       LOS: 2 days   Patient Care Team:  No Known Provider as PCP - General    Subjective     73 y.o. male CEAP 6 bilaterally  Bleeding ulcer right  Chronic VSU left    Reviewed bilateral mapping  R gsv reflux >2400, diam > 10mm  L gsv reflux > 3300ms, diam > 10mm    Some chronic stp noted  Small segment of left gsv at knee with new stp  No dvt    Review of Systems  Review of Systems -NO COMPLAINTS    Objective     Vital Signs  Temp:  [97.1 °F (36.2 °C)-98.9 °F (37.2 °C)] 98.4 °F (36.9 °C)  Heart Rate:  [54-60] 59  Resp:  [16-18] 16  BP: ()/(49-75) 94/49    Physical Exam  General: No acute distress. Alert  CV: RRR  Resp: unlabored breathing on ra  Extremities: edema much improved    Results Review:       Recent Results (from the past 12 hour(s))   Basic Metabolic Panel    Collection Time: 06/01/18  5:56 AM   Result Value Ref Range    Glucose 134 (H) 65 - 99 mg/dL    BUN 15 8 - 23 mg/dL    Creatinine 1.27 0.76 - 1.27 mg/dL    Sodium 139 136 - 145 mmol/L    Potassium 4.0 3.5 - 5.2 mmol/L    Chloride 103 98 - 107 mmol/L    CO2 26.1 22.0 - 29.0 mmol/L    Calcium 8.6 8.6 - 10.5 mg/dL    eGFR Non African Amer 56 (L) >60 mL/min/1.73    BUN/Creatinine Ratio 11.8 7.0 - 25.0    Anion Gap 9.9 mmol/L   CBC Auto Differential    Collection Time: 06/01/18  5:56 AM   Result Value Ref Range    WBC 8.40 4.50 - 10.70 10*3/mm3    RBC 4.45 (L) 4.60 - 6.00 10*6/mm3    Hemoglobin 12.9 (L) 13.7 - 17.6 g/dL    Hematocrit 38.6 (L) 40.4 - 52.2 %    MCV 86.7 79.8 - 96.2 fL    MCH 29.0 27.0 - 32.7 pg    MCHC 33.4 32.6 - 36.4 g/dL    RDW 13.8 11.5 - 14.5 %    RDW-SD 44.0 37.0 - 54.0 fl    MPV 9.5 6.0 - 12.0 fL    Platelets 297 140 - 500 10*3/mm3    Neutrophil % 65.6 42.7 - 76.0 %    Lymphocyte % 22.1 19.6 - 45.3 %    Monocyte % 8.0 5.0 - 12.0 %    Eosinophil % 3.8 0.3 - 6.2 %    Basophil % 0.5 0.0 - 1.5 %    Immature Grans % 0.2 0.0 - 0.5 %    Neutrophils, Absolute 5.51 1.90 - 8.10 10*3/mm3    Lymphocytes, Absolute  1.86 0.90 - 4.80 10*3/mm3    Monocytes, Absolute 0.67 0.20 - 1.20 10*3/mm3    Eosinophils, Absolute 0.32 0.00 - 0.70 10*3/mm3    Basophils, Absolute 0.04 0.00 - 0.20 10*3/mm3    Immature Grans, Absolute 0.02 0.00 - 0.03 10*3/mm3   POC Glucose Once    Collection Time: 06/01/18  6:38 AM   Result Value Ref Range    Glucose 127 70 - 130 mg/dL   POC Glucose Once    Collection Time: 06/01/18 11:59 AM   Result Value Ref Range    Glucose 148 (H) 70 - 130 mg/dL   ]      Assessment/Plan           Active Problems:    Essential hypertension    Open wound of left lower leg    Venous insufficiency of both lower extremities    Diabetes mellitus    Asperger's syndrome    Hyperkeratosis of skin    Cellulitis of left lower extremity      Assessment & Plan  73 y.o. male bilateral CEAP 6  Anatomy good for endothermal or adhesive ablation of incompetent saphenous systems.  Risk of bleeding, infection, DVT, PE, discussed in addition to travel restrictions  He says he wants to proceed with ablation to augment healing    Ablations are done in office.  He will need TH Class I compression hose, 20-30mm HG, prior to discharge and to BRING TO OFFICE DAY OF PROCEDURE  My office will contact pt regarding timing    He will need fu in [facility] for weekly Profore wrap to LLE     There is NO CONTRAINDICATION to physical therapy  Walking is encouraged          Jaja Hearn MD  06/01/18  12:20 PM

## 2018-06-01 NOTE — THERAPY DISCHARGE NOTE
Acute Care - Physical Therapy Initial Eval/Discharge  [facility]     Patient Name: Don Verde Jr.  : 1945  MRN: [id number]  Today's Date: 2018   Onset of Illness/Injury or Date of Surgery: (P) 18  Date of Referral to PT: (P) 18  Referring Physician: North (P)      Admit Date: 2018    Visit Dx:    ICD-10-CM ICD-9-CM   1. Open wound of left lower leg, initial encounter S81.802A 891.0   2. Left leg cellulitis L03.116 682.6   3. Self-care deficit for hygiene R46.0 V40.39   4. Asperger syndrome F84.5 299.80     Patient Active Problem List   Diagnosis   • Abnormal ECG   • Arteriosclerosis of coronary artery   • Essential hypertension   • Shortness of breath    • Open wound of left lower leg   • Venous insufficiency of both lower extremities   • Diabetes mellitus   • Asperger's syndrome   • Hyperkeratosis of skin   • Cellulitis of left lower extremity     Past Medical History:   Diagnosis Date   • Abnormal ECG    • Asperger's syndrome    • Diabetes mellitus    • Hyperlipidemia    • Hypertension      Past Surgical History:   Procedure Laterality Date   • APPENDECTOMY     • VEIN SURGERY            PT ASSESSMENT (last 12 hours)      Physical Therapy Evaluation     Row Name 18 1300          PT Evaluation Time/Intention    Subjective Information (P)  complains of;pain  -     Document Type (P)  discharge evaluation/summary  -     Mode of Treatment (P)  physical therapy  -     Patient Effort (P)  good  -     Row Name 18 1300          General Information    Patient Profile Reviewed? (P)  yes  -     Onset of Illness/Injury or Date of Surgery (P)  18  -     Referring Physician (P)  Mary Anne  -     Patient Observations (P)  alert;cooperative;agree to therapy  -     Patient/Family Observations (P)  Pt sitting EOB; no acute signs of distress   -     Prior Level of Function (P)  independent:  -     Equipment Currently Used at Home (P)  none  -     Pertinent  History of Current Functional Problem (P)  Pt is being seen after admittance to hospital for Open wound of left lower leg/ cellulitis.   -     Existing Precautions/Restrictions (P)  fall  -JH     Row Name 06/01/18 1300          Relationship/Environment    Primary Source of Support/Comfort (P)  sibling(s)  -     Lives With (P)  alone  -     Family Caregiver if Needed (P)  sibling(s)  -JH     Row Name 06/01/18 1300          Resource/Environmental Concerns    Current Living Arrangements (P)  home/apartment/condo  -[facility] 06/01/18 1300          Home Main Entrance    Number of Stairs, Main Entrance (P)  three  -     Stair Railings, Main Entrance (P)  none  -JH     Row Name 06/01/18 1300          Cognitive Assessment/Intervention- PT/OT    Orientation Status (Cognition) (P)  oriented x 3  -     Follows Commands (Cognition) (P)  [facility]  -JH     Row Name 06/01/18 1300          Bed Mobility Assessment/Treatment    Bed Mobility Assessment/Treatment (P)  sit-supine  -     Sit-Supine [place] (Bed Mobility) (P)  supervision  -     Assistive Device (Bed Mobility) (P)  head of bed elevated  -JH     Row Name 06/01/18 1300          Transfer Assessment/Treatment    Transfer Assessment/Treatment (P)  sit-stand transfer;stand-sit transfer  -     Sit-Stand [place] (Transfers) (P)  supervision  -     Stand-Sit [place] (Transfers) (P)  supervision  -JH     Row Name 06/01/18 1300          Gait/Stairs Assessment/Training    [place] Level (Gait) (P)  contact guard;1 person assist;supervision  -     Distance in Feet (Gait) (P)  400  -     Pattern (Gait) (P)  step-through  -     Deviations/Abnormal Patterns (Gait) (P)  gait speed decreased;stride length decreased  -[facility] Name 06/01/18 1300          General ROM    GENERAL ROM COMMENTS (P)  B LE AROM grossly WFL  -JH     Row Name 06/01/18 1300          General Assessment (Manual Muscle Testing)    Comment, General Manual Muscle Testing (MMT)  Assessment (P)  B LE MMT grossly at least 3/5  -[facility] Name 06/01/18 1300          Pain Assessment    Additional Documentation (P)  Pain Scale: Numbers Pre/Post-Treatment (Group)  -JH     Row Name 06/01/18 1300          Pain Scale: Numbers Pre/Post-Treatment    Pain Scale: Numbers, Pretreatment (P)  4/10  [facility]     Pain Scale: Numbers, Post-Treatment (P)  4/10  [facility]     Pain Location - Side (P)  Left  -     Pain Location (P)  foot  -     Pain Intervention(s) (P)  Repositioned;Ambulation/increased activity  -[facility] Name             Wound 05/30/18 1800 Left leg unspecified    Wound - Properties Group Date first assessed: 05/30/18  - Time first assessed: 1800  - Present On Admission : yes;picture taken  - Side: Left  - Location: leg  - Type: unspecified  -EB, cellulitis     Row Name 06/01/18 1300          Coping    Observed Emotional State (P)  accepting;calm;cooperative  -     Verbalized Emotional State (P)  acceptance  -[facility] Name 06/01/18 1300          Plan of Care Review    Plan of Care Reviewed With (P)  patient  -JH     Row Name 06/01/18 1300          Physical Therapy Clinical Impression    Date of Referral to PT (P)  05/30/18  -     Prognosis (PT Clinical Impression) (P)  good   -     Functional Level at Time of Evaluation (PT Clinical Impression) (P)  [facility]   -     Criteria for Skilled Interventions Met (PT Clinical Impression) (P)  no;no problems identified which require skilled intervention  -JH     Row Name 06/01/18 1300          Positioning and Restraints    Pre-Treatment Position (P)  in bed  -     Post Treatment Position (P)  bed  -     In Bed (P)  supine;call light within reach;encouraged to call for assist;exit alarm on  -[facility] Name 06/01/18 1300          Living Environment    Home Accessibility (P)  stairs to enter home  -       User Key  (r) = Recorded By, (t) = Taken By, (c) = Cosigned By    Initials Name Provider Type    LEROY Kurtz RN Registered  Nurse    MARIOLA Arroyo, PT Student PT Student              PT Recommendation and Plan  Anticipated Discharge Disposition (PT): (P) home with home health  Therapy Frequency (PT Clinical Impression): (P) evaluation only  Outcome Summary/Treatment Plan (PT)  Anticipated Discharge Disposition (PT): (P) home with home health  Plan of Care Reviewed With: (P) patient  Outcome Summary: (P)  Pt is being seen after admittance to hospital for Open wound of left lower leg/ cellulitis. Pt was able to ambulate 400 w/o AD, and was supervision in all functional mobility tasks. Pt does not require skilled intervention from PT, PT will not  pt at this time. Pt encouraged to call nsg for supervision assist during ambulation for duration of stay.           Outcome Measures     Row Name 06/01/18 1400 05/31/18 1600          How much help from another person do you currently need...    Turning from your back to your side while in flat bed without using bedrails? (P)  4  -JH  --     Moving from lying on back to sitting on the side of a flat bed without bedrails? (P)  4  -JH  --     Moving to and from a bed to a chair (including a wheelchair)? (P)  4  -JH  --     Standing up from a chair using your arms (e.g., wheelchair, bedside chair)? (P)  4  -JH  --     Climbing 3-5 steps with a railing? (P)  3  -JH  --     To walk in hospital room? (P)  4  -JH  --     AM-PAC 6 Clicks Score (P)  23  -JH  --        How much help from another is currently needed...    Putting on and taking off regular lower body clothing?  -- 3  -VS     Bathing (including washing, rinsing, and drying)  -- 3  -VS     Toileting (which includes using toilet bed pan or urinal)  -- 3  -VS     Putting on and taking off regular upper body clothing  -- 3  -VS     Taking care of personal grooming (such as brushing teeth)  -- 3  -VS     Eating meals  -- 4  -VS     Score  -- 19  -VS        Functional Assessment    Outcome Measure Options (P)  AM-PAC 6 Clicks Basic  Mobility (PT)  - AM-PAC 6 Clicks Daily Activity (OT)  -VS       User Key  (r) = Recorded By, (t) = Taken By, (c) = Cosigned By    Initials Name Provider Type    VS REAGAN Arteaga Occupational Therapist     FREEDOM Arroyo, PT Student PT Student           Time Calculation:         PT Charges     Row Name 06/01/18 1358             Time Calculation    Start Time (P)  1332  -      Stop Time (P)  1350  -      Time Calculation (min) (P)  18 min  -      PT Received On (P)  06/01/18  -        User Key  (r) = Recorded By, (t) = Taken By, (c) = Cosigned By    Initials Name Provider Type    MARIOLA Arroyo, PT Student PT Student          Therapy Charges for Today     Code Description Service Date Service Provider Modifiers Qty    24338838313 HC PT EVAL LOW COMPLEXITY 1 6/1/2018 FREEDOM Arroyo, PT Student GP 1    09011320948 HC PT THER PROC EA 15 MIN 6/1/2018 FREEDOM Arroyo, PT Student GP 1          PT G-Codes  Outcome Measure Options: (P) AM-PAC 6 Clicks Basic Mobility (PT)    PT Discharge Summary  Anticipated Discharge Disposition (PT): (P) home with home health    FREEDOM Arroyo, PT Student  6/1/2018

## 2018-06-01 NOTE — PLAN OF CARE
Problem: Patient Care Overview  Goal: Plan of Care Review   06/01/18 4399   Coping/Psychosocial   Plan of Care Reviewed With patient   OTHER   Outcome Summary Pt is being seen after admittance to hospital for Open wound of left lower leg/ cellulitis. Pt able to ambulate 400 w/o AD, and was supervision in all functional mobility tasks. Pt does not require skilled intervention from PT, PT will not  pt at this time. Pt encouraged to call nsg for supervision assist during ambulation for duration of stay.

## 2018-06-01 NOTE — THERAPY TREATMENT NOTE
Acute Care - Occupational Therapy Lymphedema Treatment Note  [facility]     Patient Name: Don Verde Jr.  : 1945  MRN: [id number]  Today's Date: 2018  Onset of Illness/Injury or Date of Surgery: (P) 18     Referring Physician: North (P)        Admit Date: 2018    Visit Dx:    ICD-10-CM ICD-9-CM   1. Open wound of left lower leg, initial encounter S81.802A 891.0   2. Left leg cellulitis L03.116 682.6   3. Self-care deficit for hygiene R46.0 V40.39   4. Asperger syndrome F84.5 299.80       Patient Active Problem List   Diagnosis   • Abnormal ECG   • Arteriosclerosis of coronary artery   • Essential hypertension   • Shortness of breath    • Open wound of left lower leg   • Venous insufficiency of both lower extremities   • Diabetes mellitus   • Asperger's syndrome   • Hyperkeratosis of skin   • Cellulitis of left lower extremity             Therapy Treatment        Rehabilitation Treatment Summary     Row Name 18 1415             Treatment Time/Intention    Discipline occupational therapist   Lymphedema note  -CW      Document Type therapy note (daily note)  -CW      Subjective Information --   C/o pain R arm IV site  -CW      Mode of Treatment occupational therapy  -CW      Patient/Family Observations Pt. in bed. Wants to use the restroom .  -CW      Existing Precautions/Restrictions fall   universal/wound precautions.   -CW      Recorded by [CW] REAGAN Garza 18 1435      Row Name 18 1415             Bed Mobility Assessment/Treatment    Sit-Supine [place] (Bed Mobility) supervision  -CW      Recorded by [CW] REAGAN Garza 18 1435      Row Name 18 1415             Transfer Assessment/Treatment    Transfer Assessment/Treatment toilet transfer   ambulated to  with CGA/Supervision  -CW      Sit-Stand [place] (Transfers) supervision  -CW      Stand-Sit [place] (Transfers) supervision  -CW      [place] Level (Toilet Transfer)  supervision;contact guard  -CW      Recorded by [CW] Enedina Hurtado, OTR 06/01/18 1435      Row Name 06/01/18 1415             Toilet Transfer    Type (Toilet Transfer) --   Ambulated to BR  -CW      Recorded by [CW] Enedina Hrutado, OTR 06/01/18 1435      Row Name 06/01/18 1415             Bathing Assessment/Intervention    Bathing [place] Level lower body;upper body;set up  -CW      Comment (Bathing) Bandages removed. Washed BLE's' with mild soap. Was able to complete a sponge bath in BR.   -CW      Recorded by [CW] Enedina Hurtado, OTR 06/01/18 1435      Row Name 06/01/18 1415             Positioning and Restraints    Pre-Treatment Position in bed  -CW      Post Treatment Position bed  -CW      In Bed sitting EOB;call light within reach;encouraged to call for assist  -CW      Recorded by [CW] Enedina Hurtado, OTR 06/01/18 1435      Row Name 06/01/18 1415             Pain Assessment    Additional Documentation --   No indications of LE pain.   -CW      Recorded by [CW] Enedina Hurtado OTR 06/01/18 1435      Row Name 06/01/18 1415             Pain Scale: Numbers Pre/Post-Treatment    Pain Scale: Numbers, Pretreatment 0/10 - no pain  -CW      Pain Scale: Numbers, Post-Treatment 0/10 - no pain  -CW      Recorded by [CW] Enedina Hurtado, OTR 06/01/18 1435      Row Name 06/01/18 1415             Edema Assessment & Management    Location (Edema) lower extremity, left;lower extremity, right   Cetaphil lotion to B lower legs.   -CW      Additional Documentation --   Gauze R medial heel from recent injection.   -CW2      Recorded by [CW] Enedina Hurtado, OTR 06/01/18 1442  [CW2] Enedina Hurtado OTR 06/01/18 1435      Row Name 06/01/18 1415             Edema Symptoms/Interventions    Description (Edema) --   Skin firm, dry and red b lower legs.ABD pad, kerlix LLE.   -CW      Associated Symptoms (Edema) --   L ant. shin wound/ulcer noted with min drainage.   -CW2      Treatment Interventions (Edema) compression bandaging, short stretch    Tg. 9, 2- 15 cm Artiiflex, 1- 8cm. 2-10 cm. Rosidal. Abd pad  -CW      Recorded by [CW] Enedina Hurtado OTR 06/01/18 1435  [CW2] Enedina Hurtado OTR 06/01/18 1442      Row Name                Wound 05/30/18 1800 Left leg unspecified    Wound - Properties Group Date first assessed: 05/30/18 [EB] Time first assessed: 1800 [EB] Present On Admission : yes;picture taken [EB] Side: Left [EB] Location: leg [EB] Type: unspecified [EB], cellulitis  Recorded by:  [EB] Jaja Kurtz, ELLIE 05/30/18 1938      User Key  (r) = Recorded By, (t) = Taken By, (c) = Cosigned By    Initials Name Effective Dates Discipline    CW Enedina Hurtado OTR 03/07/18 -  OT    LEROY Kurtz, ELLIE 11/21/17 -  Nurse          Wound 05/30/18 1800 Left leg unspecified (Active)   Dressing Appearance dry;intact 6/1/2018  9:15 AM   Closure ANDREW 6/1/2018  9:15 AM   Base dressing in place, unable to visualize 6/1/2018  9:15 AM   Drainage Amount none 5/31/2018  8:26 PM   Care, Wound melisa boot;other (see comments) 5/31/2018  8:26 PM   Dressing Care, Wound gauze, dry 5/31/2018  8:26 PM   Periwound Care, Wound dry periwound area maintained 5/31/2018  8:26 PM       Outcome Summary           Occupational Therapy Education     Title: PT OT SLP Therapies (Done)     Topic: Occupational Therapy (Done)     Point: Precautions (Done)     Description: Instruct learner(s) on prescribed precautions during self-care and functional transfers.   Learning Progress Summary     Learner Status Readiness Method Response Comment Documented by    Patient Done Acceptance E,D,H VU,NR Reviewed bandage precautions, wearing schedule with pt. and nursing. Nursing to apply the bandages over the weekend. Issued handouts. Reviewed safety when ambulating to BR.  06/01/18 1435     Done Eager E,D NR,VU  VS 05/31/18 1642                      User Key     Initials Effective Dates Name Provider Type Discipline    VS 03/07/18 -  REAGAN Arteaga Occupational Therapist OT    CW 03/07/18 -   Enedina Hurtado, OTR Occupational Therapist OT                   OT Recommendation and Plan     Outcome Summary: Lymphedema OT- Pt. tolerated the bandages last night. Skin BLE's firm, red with dry cracked skin. Wound L ant. shin with min drainage. Reviewed bandage precautions and wearing schedule. Nursing to apply the bandages over the weekend. Will f/u Monday.           Outcome Measures     Row Name 06/01/18 1400 05/31/18 1600          How much help from another person do you currently need...    Turning from your back to your side while in flat bed without using bedrails? (P)  4  -JH  --     Moving from lying on back to sitting on the side of a flat bed without bedrails? (P)  4  -JH  --     Moving to and from a bed to a chair (including a wheelchair)? (P)  4  -JH  --     Standing up from a chair using your arms (e.g., wheelchair, bedside chair)? (P)  4  -JH  --     Climbing 3-5 steps with a railing? (P)  3  -JH  --     To walk in hospital room? (P)  4  -JH  --     AM-PAC 6 Clicks Score (P)  23  -CW (r) JH (t)  --        How much help from another is currently needed...    Putting on and taking off regular lower body clothing? 3  -CW 3  -VS     Bathing (including washing, rinsing, and drying) 3  -CW 3  -VS     Toileting (which includes using toilet bed pan or urinal) 4  -CW 3  -VS     Putting on and taking off regular upper body clothing 4  -CW 3  -VS     Taking care of personal grooming (such as brushing teeth) 3  -CW 3  -VS     Eating meals 4  -CW 4  -VS     Score 21  -CW 19  -VS        Functional Assessment    Outcome Measure Options (P)  AM-PAC 6 Clicks Basic Mobility (PT)  -JH AM-PAC 6 Clicks Daily Activity (OT)  -VS       User Key  (r) = Recorded By, (t) = Taken By, (c) = Cosigned By    Initials Name Provider Type    VS Fay Mohan OTR Occupational Therapist    DEWAYNE Hurtado, OTR Occupational Therapist    MARIOLA Arroyo, PT Student PT Student            Time Calculation:         Time Calculation- OT      Row Name 06/01/18 1443             Time Calculation- OT    OT Start Time 1224  -CW      OT Stop Time 1328  -CW      OT Time Calculation (min) 64 min  -CW      OT Non-Billable Time (min) 15 min  -CW        User Key  (r) = Recorded By, (t) = Taken By, (c) = Cosigned By    Initials Name Provider Type    CW REAGAN Garza Occupational Therapist            Therapy Charges for Today     Code Description Service Date Service Provider Modifiers Qty    65734366577 HC OT MANUAL THERAPY EA 15 MIN 6/1/2018 REAGAN Garza GO 3                   REAGAN Garza  6/1/2018

## 2018-06-01 NOTE — PLAN OF CARE
Problem: Patient Care Overview  Goal: Plan of Care Review   06/01/18 1437   OTHER   Outcome Summary Lymphedema OT- Pt. tolerated the bandages last night. Skin BLE's firm, red with dry cracked skin. Wound L ant. shin with min drainage. Reviewed bandage precautions and wearing schedule. Nursing to apply bandages over the weekend. Will f/u Monday.

## 2018-06-01 NOTE — DISCHARGE INSTRUCTIONS
At discharge, follow up with wound care clinic for weekly wraps with Profore to left leg; may continue to wear compression stocking on right leg.   Call Mae in surgery scheduling at [phone] to schedule procedure with Dr. Hearn

## 2018-06-01 NOTE — PLAN OF CARE
Problem: Patient Care Overview  Goal: Plan of Care Review  Outcome: Ongoing (interventions implemented as appropriate)   06/01/18 3494   Coping/Psychosocial   Plan of Care Reviewed With patient   Plan of Care Review   Progress no change   OTHER   Outcome Summary Pt. VS WNL. No c/o pain. Dressing changes and melisa boots per OT daily. Pt. recieving iv abx. Attempted to get pt. to turn q2h, pt. refused, states he prefers to sleep on back. Pt. had vein mapping done yesterday. Pt. resting comfortably at present, will continue to monitor closely.,     Goal: Individualization and Mutuality  Outcome: Ongoing (interventions implemented as appropriate)    Goal: Discharge Needs Assessment  Outcome: Ongoing (interventions implemented as appropriate)      Problem: Skin and Soft Tissue Infection (Adult)  Goal: Signs and Symptoms of Listed Potential Problems Will be Absent, Minimized or Managed (Skin and Soft Tissue Infection)  Outcome: Ongoing (interventions implemented as appropriate)

## 2018-06-02 LAB
ANION GAP SERPL CALCULATED.3IONS-SCNC: 10.2 MMOL/L
BASOPHILS # BLD AUTO: 0.04 10*3/MM3 (ref 0–0.2)
BASOPHILS NFR BLD AUTO: 0.5 % (ref 0–1.5)
BUN BLD-MCNC: 17 MG/DL (ref 8–23)
BUN/CREAT SERPL: 12.8 (ref 7–25)
CALCIUM SPEC-SCNC: 9.3 MG/DL (ref 8.6–10.5)
CHLORIDE SERPL-SCNC: 104 MMOL/L (ref 98–107)
CO2 SERPL-SCNC: 25.8 MMOL/L (ref 22–29)
CREAT BLD-MCNC: 1.33 MG/DL (ref 0.76–1.27)
DEPRECATED RDW RBC AUTO: 44.2 FL (ref 37–54)
EOSINOPHIL # BLD AUTO: 0.34 10*3/MM3 (ref 0–0.7)
EOSINOPHIL NFR BLD AUTO: 4 % (ref 0.3–6.2)
ERYTHROCYTE [DISTWIDTH] IN BLOOD BY AUTOMATED COUNT: 14.1 % (ref 11.5–14.5)
GFR SERPL CREATININE-BSD FRML MDRD: 53 ML/MIN/1.73
GLUCOSE BLD-MCNC: 139 MG/DL (ref 65–99)
GLUCOSE BLDC GLUCOMTR-MCNC: 119 MG/DL (ref 70–130)
GLUCOSE BLDC GLUCOMTR-MCNC: 146 MG/DL (ref 70–130)
GLUCOSE BLDC GLUCOMTR-MCNC: 155 MG/DL (ref 70–130)
GLUCOSE BLDC GLUCOMTR-MCNC: 181 MG/DL (ref 70–130)
HCT VFR BLD AUTO: 39.9 % (ref 40.4–52.2)
HGB BLD-MCNC: 12.7 G/DL (ref 13.7–17.6)
IMM GRANULOCYTES # BLD: 0 10*3/MM3 (ref 0–0.03)
IMM GRANULOCYTES NFR BLD: 0 % (ref 0–0.5)
LYMPHOCYTES # BLD AUTO: 1.81 10*3/MM3 (ref 0.9–4.8)
LYMPHOCYTES NFR BLD AUTO: 21.3 % (ref 19.6–45.3)
MCH RBC QN AUTO: 27.8 PG (ref 27–32.7)
MCHC RBC AUTO-ENTMCNC: 31.8 G/DL (ref 32.6–36.4)
MCV RBC AUTO: 87.3 FL (ref 79.8–96.2)
MONOCYTES # BLD AUTO: 0.87 10*3/MM3 (ref 0.2–1.2)
MONOCYTES NFR BLD AUTO: 10.2 % (ref 5–12)
NEUTROPHILS # BLD AUTO: 5.45 10*3/MM3 (ref 1.9–8.1)
NEUTROPHILS NFR BLD AUTO: 64 % (ref 42.7–76)
PLATELET # BLD AUTO: 286 10*3/MM3 (ref 140–500)
PMV BLD AUTO: 9.6 FL (ref 6–12)
POTASSIUM BLD-SCNC: 4.1 MMOL/L (ref 3.5–5.2)
RBC # BLD AUTO: 4.57 10*6/MM3 (ref 4.6–6)
SODIUM BLD-SCNC: 140 MMOL/L (ref 136–145)
WBC NRBC COR # BLD: 8.51 10*3/MM3 (ref 4.5–10.7)

## 2018-06-02 PROCEDURE — 63710000001 INSULIN ASPART PER 5 UNITS: Performed by: INTERNAL MEDICINE

## 2018-06-02 PROCEDURE — 25010000002 PIPERACILLIN SOD-TAZOBACTAM PER 1 G: Performed by: INTERNAL MEDICINE

## 2018-06-02 PROCEDURE — 85025 COMPLETE CBC W/AUTO DIFF WBC: CPT | Performed by: INTERNAL MEDICINE

## 2018-06-02 PROCEDURE — 80048 BASIC METABOLIC PNL TOTAL CA: CPT | Performed by: INTERNAL MEDICINE

## 2018-06-02 PROCEDURE — 82962 GLUCOSE BLOOD TEST: CPT

## 2018-06-02 PROCEDURE — 25010000002 ENOXAPARIN PER 10 MG: Performed by: INTERNAL MEDICINE

## 2018-06-02 PROCEDURE — 25010000002 VANCOMYCIN 10 G RECONSTITUTED SOLUTION: Performed by: INTERNAL MEDICINE

## 2018-06-02 RX ADMIN — TAZOBACTAM SODIUM AND PIPERACILLIN SODIUM 3.38 G: 375; 3 INJECTION, SOLUTION INTRAVENOUS at 21:34

## 2018-06-02 RX ADMIN — INSULIN ASPART 2 UNITS: 100 INJECTION, SOLUTION INTRAVENOUS; SUBCUTANEOUS at 08:50

## 2018-06-02 RX ADMIN — EMOLLIENT - LOTION: LOTION at 08:50

## 2018-06-02 RX ADMIN — ENOXAPARIN SODIUM 40 MG: 40 INJECTION SUBCUTANEOUS at 21:34

## 2018-06-02 RX ADMIN — TAZOBACTAM SODIUM AND PIPERACILLIN SODIUM 3.38 G: 375; 3 INJECTION, SOLUTION INTRAVENOUS at 12:24

## 2018-06-02 RX ADMIN — VANCOMYCIN HYDROCHLORIDE 1250 MG: 10 INJECTION, POWDER, LYOPHILIZED, FOR SOLUTION INTRAVENOUS at 14:23

## 2018-06-02 RX ADMIN — VANCOMYCIN HYDROCHLORIDE 1250 MG: 10 INJECTION, POWDER, LYOPHILIZED, FOR SOLUTION INTRAVENOUS at 02:38

## 2018-06-02 RX ADMIN — FUROSEMIDE 20 MG: 20 TABLET ORAL at 08:50

## 2018-06-02 RX ADMIN — ATORVASTATIN CALCIUM 40 MG: 20 TABLET, FILM COATED ORAL at 08:50

## 2018-06-02 RX ADMIN — EMOLLIENT - LOTION: LOTION at 21:34

## 2018-06-02 RX ADMIN — AMLODIPINE BESYLATE 5 MG: 5 TABLET ORAL at 08:50

## 2018-06-02 RX ADMIN — TAZOBACTAM SODIUM AND PIPERACILLIN SODIUM 3.38 G: 375; 3 INJECTION, SOLUTION INTRAVENOUS at 04:20

## 2018-06-02 NOTE — PROGRESS NOTES
"    DAILY PROGRESS NOTE  [facility]    Patient Identification:  Name: Don Verde Jr.  Age: 73 y.o.  Sex: male  :  1945  MRN: [id number]         Primary Care Physician: No Known Provider    Subjective:  Interval History: no new c/o per patient - d/w brother otp - denies n/v/d/cp/sob    Objective:    Scheduled Meds:  amLODIPine 5 mg Oral Daily   atorvastatin 40 mg Oral Daily   cetaphil  Topical Q12H   enoxaparin 40 mg Subcutaneous Nightly   furosemide 20 mg Oral Daily   insulin aspart 0-7 Units Subcutaneous 4x Daily With Meals & Nightly   piperacillin-tazobactam 3.375 g Intravenous Q8H   vancomycin 1,250 mg Intravenous Q12H     Continuous Infusions:  Pharmacy to dose vancomycin    Pharmacy to Dose Zosyn        Vital signs in last 24 hours:  Temp:  [97.6 °F (36.4 °C)-98.8 °F (37.1 °C)] 98.2 °F (36.8 °C)  Heart Rate:  [51-62] 58  Resp:  [16] 16  BP: ()/(60-70) 115/70    Intake/Output:    Intake/Output Summary (Last 24 hours) at 18 1650  Last data filed at 18 1230   Gross per 24 hour   Intake           2145.6 ml   Output                0 ml   Net           2145.6 ml       Exam:  /70 (BP Location: Left arm, Patient Position: Sitting)   Pulse 58   Temp 98.2 °F (36.8 °C) (Oral)   Resp 16   Ht 180.3 cm (71\")   Wt 108 kg (239 lb)   SpO2 98%   BMI 33.33 kg/m²     General Appearance:    Alert, cooperative, no distress, sleeping w/ TV on full volume                         Throat:   Lips, tongue, gums normal; oral mucosa pink and moist                         Lungs:    Clear to auscultation bilaterally, respirations unlabored                 Chest Wall:    No tenderness or deformity                          Heart:    Regular rate and rhythm, S1 and S2 normal                  Abdomen:     Soft, non-tender, bowel sounds active                 Extremities:   LE wraps                    Data Review:  Labs in chart were reviewed.    Assessment:  Active Hospital Problems (** " Indicates Principal Problem)    Diagnosis Date Noted   • Open wound of left lower leg [S81.802A] 05/30/2018   • Venous insufficiency of both lower extremities [I87.2] 05/30/2018   • Diabetes mellitus [E11.9] 05/30/2018   • Asperger's syndrome [F84.5] 05/30/2018   • Hyperkeratosis of skin [L85.9] 05/30/2018   • Cellulitis of left lower extremity [L03.116] 05/30/2018   • Essential hypertension [I10] 04/18/2018      Resolved Hospital Problems    Diagnosis Date Noted Date Resolved   No resolved problems to display.       Plan:  D4 Vanc/Zosyn - Vascular notes reviewed and appreciate ALL - remainder of Tx can be completed as outpt but we have CCP logistic issues to coordinate/complete. I d/w brother otp at [phone] and transportation is a big issue. PCP is now organized through [facility] but will need to have outpt  assistance and need further help w/ transportation - I will keep here until Monday so the above can be figured and we can get as safe a DC as possible for this man who still somehow lives independently o/w. For future reference, his Cardiologist is Dr Weir.     HTN - stable      DM2 - ssi      Asperger's       lovenox for DVT ppx     Juan Miguel Rowe MD  6/2/2018  4:50 PM

## 2018-06-02 NOTE — PROGRESS NOTES
Landon Peoples MD       LOS: 3 days   Patient Care Team:  No Known Provider as PCP - General    Subjective     73 y.o. male CEAP 6 bilaterally  Bleeding ulcer right  Chronic VSU left    Reviewed bilateral mapping  R gsv reflux >2400, diam > 10mm  L gsv reflux > 3300ms, diam > 10mm    Some chronic stp noted  Small segment of left gsv at knee with new stp  No dvt    Review of Systems  Review of Systems -NO COMPLAINTS    Objective     Vital Signs  Temp:  [97.3 °F (36.3 °C)-98.8 °F (37.1 °C)] 97.6 °F (36.4 °C)  Heart Rate:  [51-62] 55  Resp:  [16] 16  BP: ()/(60-74) 123/69    Physical Exam  General: No acute distress. Alert  CV: RRR  Resp: unlabored breathing on ra  Extremities: edema much improved, in Unna boot bilaterally.     Results Review:       Recent Results (from the past 12 hour(s))   POC Glucose Once    Collection Time: 06/02/18  6:36 AM   Result Value Ref Range    Glucose 181 (H) 70 - 130 mg/dL   CBC Auto Differential    Collection Time: 06/02/18  7:52 AM   Result Value Ref Range    WBC 8.51 4.50 - 10.70 10*3/mm3    RBC 4.57 (L) 4.60 - 6.00 10*6/mm3    Hemoglobin 12.7 (L) 13.7 - 17.6 g/dL    Hematocrit 39.9 (L) 40.4 - 52.2 %    MCV 87.3 79.8 - 96.2 fL    MCH 27.8 27.0 - 32.7 pg    MCHC 31.8 (L) 32.6 - 36.4 g/dL    RDW 14.1 11.5 - 14.5 %    RDW-SD 44.2 37.0 - 54.0 fl    MPV 9.6 6.0 - 12.0 fL    Platelets 286 140 - 500 10*3/mm3    Neutrophil % 64.0 42.7 - 76.0 %    Lymphocyte % 21.3 19.6 - 45.3 %    Monocyte % 10.2 5.0 - 12.0 %    Eosinophil % 4.0 0.3 - 6.2 %    Basophil % 0.5 0.0 - 1.5 %    Immature Grans % 0.0 0.0 - 0.5 %    Neutrophils, Absolute 5.45 1.90 - 8.10 10*3/mm3    Lymphocytes, Absolute 1.81 0.90 - 4.80 10*3/mm3    Monocytes, Absolute 0.87 0.20 - 1.20 10*3/mm3    Eosinophils, Absolute 0.34 0.00 - 0.70 10*3/mm3    Basophils, Absolute 0.04 0.00 - 0.20 10*3/mm3    Immature Grans, Absolute 0.00 0.00 - 0.03 10*3/mm3   Basic Metabolic Panel    Collection Time: 06/02/18  7:54 AM   Result  Value Ref Range    Glucose 139 (H) 65 - 99 mg/dL    BUN 17 8 - 23 mg/dL    Creatinine 1.33 (H) 0.76 - 1.27 mg/dL    Sodium 140 136 - 145 mmol/L    Potassium 4.1 3.5 - 5.2 mmol/L    Chloride 104 98 - 107 mmol/L    CO2 25.8 22.0 - 29.0 mmol/L    Calcium 9.3 8.6 - 10.5 mg/dL    eGFR Non African Amer 53 (L) >60 mL/min/1.73    BUN/Creatinine Ratio 12.8 7.0 - 25.0    Anion Gap 10.2 mmol/L   ]      Assessment/Plan           Active Problems:    Essential hypertension    Open wound of left lower leg    Venous insufficiency of both lower extremities    Diabetes mellitus    Asperger's syndrome    Hyperkeratosis of skin    Cellulitis of left lower extremity      Assessment & Plan  73 y.o. male bilateral CEAP 6  Per Dr. Hearn: Anatomy good for endothermal or adhesive ablation of incompetent saphenous systems. Risk of bleeding, infection, DVT, PE, discussed in addition to travel restrictions. He says he wants to proceed with ablation to augment healing    He will follow up in the office for his vein procedures and in the wound care center for wraps.           Landon Peoples MD  06/02/18  9:23 AM

## 2018-06-02 NOTE — PLAN OF CARE
Problem: Patient Care Overview  Goal: Plan of Care Review  Continue symptom management and comfort care  Goal: Interprofessional Rounds/Family Conf  Outcome: Ongoing (interventions implemented as appropriate)

## 2018-06-02 NOTE — PLAN OF CARE
Problem: Patient Care Overview  Goal: Plan of Care Review  Outcome: Ongoing (interventions implemented as appropriate)   06/02/18 5582   Coping/Psychosocial   Plan of Care Reviewed With patient   Plan of Care Review   Progress no change   OTHER   Outcome Summary BLE wrapped per OT lymphedema recommendations and dressing change complete. No acute changes throughout shift. IV antibiotics continued. Brother visited at bedside. Will monitor.      Goal: Individualization and Mutuality  Outcome: Ongoing (interventions implemented as appropriate)    Goal: Discharge Needs Assessment  Outcome: Ongoing (interventions implemented as appropriate)    Goal: Interprofessional Rounds/Family Conf  Outcome: Ongoing (interventions implemented as appropriate)      Problem: Skin and Soft Tissue Infection (Adult)  Goal: Signs and Symptoms of Listed Potential Problems Will be Absent, Minimized or Managed (Skin and Soft Tissue Infection)  Outcome: Ongoing (interventions implemented as appropriate)

## 2018-06-03 LAB
GLUCOSE BLDC GLUCOMTR-MCNC: 126 MG/DL (ref 70–130)
GLUCOSE BLDC GLUCOMTR-MCNC: 131 MG/DL (ref 70–130)
GLUCOSE BLDC GLUCOMTR-MCNC: 164 MG/DL (ref 70–130)
GLUCOSE BLDC GLUCOMTR-MCNC: 167 MG/DL (ref 70–130)

## 2018-06-03 PROCEDURE — 25010000002 VANCOMYCIN 10 G RECONSTITUTED SOLUTION: Performed by: INTERNAL MEDICINE

## 2018-06-03 PROCEDURE — 63710000001 INSULIN ASPART PER 5 UNITS: Performed by: INTERNAL MEDICINE

## 2018-06-03 PROCEDURE — 25010000002 ENOXAPARIN PER 10 MG: Performed by: INTERNAL MEDICINE

## 2018-06-03 PROCEDURE — 82962 GLUCOSE BLOOD TEST: CPT

## 2018-06-03 PROCEDURE — 25010000002 PIPERACILLIN SOD-TAZOBACTAM PER 1 G: Performed by: INTERNAL MEDICINE

## 2018-06-03 RX ADMIN — FUROSEMIDE 20 MG: 20 TABLET ORAL at 10:47

## 2018-06-03 RX ADMIN — INSULIN ASPART 2 UNITS: 100 INJECTION, SOLUTION INTRAVENOUS; SUBCUTANEOUS at 17:36

## 2018-06-03 RX ADMIN — ATORVASTATIN CALCIUM 40 MG: 20 TABLET, FILM COATED ORAL at 10:47

## 2018-06-03 RX ADMIN — AMLODIPINE BESYLATE 5 MG: 5 TABLET ORAL at 10:47

## 2018-06-03 RX ADMIN — EMOLLIENT - LOTION: LOTION at 16:43

## 2018-06-03 RX ADMIN — VANCOMYCIN HYDROCHLORIDE 1250 MG: 10 INJECTION, POWDER, LYOPHILIZED, FOR SOLUTION INTRAVENOUS at 14:30

## 2018-06-03 RX ADMIN — TAZOBACTAM SODIUM AND PIPERACILLIN SODIUM 3.38 G: 375; 3 INJECTION, SOLUTION INTRAVENOUS at 16:47

## 2018-06-03 RX ADMIN — HYDROCODONE BITARTRATE AND ACETAMINOPHEN 1 TABLET: 5; 325 TABLET ORAL at 20:12

## 2018-06-03 RX ADMIN — VANCOMYCIN HYDROCHLORIDE 1250 MG: 10 INJECTION, POWDER, LYOPHILIZED, FOR SOLUTION INTRAVENOUS at 03:13

## 2018-06-03 RX ADMIN — TAZOBACTAM SODIUM AND PIPERACILLIN SODIUM 3.38 G: 375; 3 INJECTION, SOLUTION INTRAVENOUS at 05:38

## 2018-06-03 RX ADMIN — ENOXAPARIN SODIUM 40 MG: 40 INJECTION SUBCUTANEOUS at 20:12

## 2018-06-03 NOTE — PLAN OF CARE
Problem: Patient Care Overview  Goal: Plan of Care Review  Outcome: Ongoing (interventions implemented as appropriate)   06/03/18 0609   OTHER   Outcome Summary VSS, , pt up to bathroom with standby assist, BLE lymphadema wraps in place - to be changed daily -RN will have to do today per clinic no staff available Sun,     Goal: Individualization and Mutuality  Outcome: Ongoing (interventions implemented as appropriate)    Goal: Discharge Needs Assessment  Outcome: Ongoing (interventions implemented as appropriate)    Goal: Interprofessional Rounds/Family Conf  Outcome: Ongoing (interventions implemented as appropriate)      Problem: Skin and Soft Tissue Infection (Adult)  Goal: Signs and Symptoms of Listed Potential Problems Will be Absent, Minimized or Managed (Skin and Soft Tissue Infection)  Outcome: Ongoing (interventions implemented as appropriate)

## 2018-06-03 NOTE — PROGRESS NOTES
"    DAILY PROGRESS NOTE  [facility]    Patient Identification:  Name: Don Verde Jr.  Age: 73 y.o.  Sex: male  :  1945  MRN: [id number]         Primary Care Physician: No Known Provider    Subjective:  Interval History: no new issues - denies cp/sob    Objective:no fm - d/w rn at      Scheduled Meds:  amLODIPine 5 mg Oral Daily   atorvastatin 40 mg Oral Daily   cetaphil  Topical Q12H   enoxaparin 40 mg Subcutaneous Nightly   furosemide 20 mg Oral Daily   insulin aspart 0-7 Units Subcutaneous 4x Daily With Meals & Nightly   piperacillin-tazobactam 3.375 g Intravenous Q8H   vancomycin 1,250 mg Intravenous Q12H     Continuous Infusions:  Pharmacy to dose vancomycin    Pharmacy to Dose Zosyn        Vital signs in last 24 hours:  Temp:  [97.1 °F (36.2 °C)-98.3 °F (36.8 °C)] 97.1 °F (36.2 °C)  Heart Rate:  [50-58] 57  Resp:  [16] 16  BP: (105-119)/(66-71) 119/71    Intake/Output:    Intake/Output Summary (Last 24 hours) at 18 1134  Last data filed at 18 1812   Gross per 24 hour   Intake          1274.48 ml   Output                0 ml   Net          1274.48 ml       Exam:  /71 (BP Location: Right arm, Patient Position: Lying)   Pulse 57   Temp 97.1 °F (36.2 °C) (Oral)   Resp 16   Ht 180.3 cm (71\")   Wt 108 kg (239 lb)   SpO2 96%   BMI 33.33 kg/m²     General Appearance:    Alert, cooperative, no distress, clinically stable                          Throat:   Lips, tongue, gums normal; oral mucosa pink and moist                         Lungs:    Clear to auscultation bilaterally, respirations unlabored                          Heart:    Regular rate and rhythm, S1 and S2 normal                  Abdomen:     Soft, non-tender, bowel sounds active                 Extremities:   Wraps                           Data Review:  Labs in chart were reviewed.    Assessment:  Active Hospital Problems (** Indicates Principal Problem)    Diagnosis Date Noted   • Open wound of left " lower leg [S81.802A] 05/30/2018   • Venous insufficiency of both lower extremities [I87.2] 05/30/2018   • Diabetes mellitus [E11.9] 05/30/2018   • Asperger's syndrome [F84.5] 05/30/2018   • Hyperkeratosis of skin [L85.9] 05/30/2018   • Cellulitis of left lower extremity [L03.116] 05/30/2018   • Essential hypertension [I10] 04/18/2018      Resolved Hospital Problems    Diagnosis Date Noted Date Resolved   No resolved problems to display.       Plan:  D4 Vanc/Zosyn - Vascular notes reviewed and appreciate ALL - remainder of Tx can be completed as outpt but we have CCP logistic issues to coordinate/complete. I d/w brother otp at [phone] and transportation is a big issue. PCP is now organized through [facility] but will need to have outpt  assistance and need further help w/ transportation - I will keep here until Monday so the above can be figured and we can get as safe a DC as possible for this man who still somehow lives independently o/w. For future reference, his Cardiologist is Dr Weir.      HTN - stable      DM2 - ssi      Asperger's       lovenox for DVT ppx     Juan Miguel Rowe MD  6/3/2018  11:34 AM

## 2018-06-03 NOTE — PLAN OF CARE
Problem: Patient Care Overview  Goal: Plan of Care Review  Outcome: Ongoing (interventions implemented as appropriate)   06/03/18 9039   Coping/Psychosocial   Plan of Care Reviewed With patient   Plan of Care Review   Progress improving   OTHER   Outcome Summary pt lymphadema wraps changed. left leg wound still having yellow drainage and slough. IV ABX given. SC insulin given. continue to monitor.     Goal: Individualization and Mutuality  Outcome: Ongoing (interventions implemented as appropriate)    Goal: Discharge Needs Assessment  Outcome: Ongoing (interventions implemented as appropriate)    Goal: Interprofessional Rounds/Family Conf  Outcome: Ongoing (interventions implemented as appropriate)      Problem: Skin and Soft Tissue Infection (Adult)  Goal: Signs and Symptoms of Listed Potential Problems Will be Absent, Minimized or Managed (Skin and Soft Tissue Infection)  Outcome: Ongoing (interventions implemented as appropriate)

## 2018-06-04 VITALS
RESPIRATION RATE: 16 BRPM | DIASTOLIC BLOOD PRESSURE: 71 MMHG | WEIGHT: 239 LBS | HEIGHT: 71 IN | BODY MASS INDEX: 33.46 KG/M2 | SYSTOLIC BLOOD PRESSURE: 118 MMHG | TEMPERATURE: 97 F | HEART RATE: 55 BPM | OXYGEN SATURATION: 96 %

## 2018-06-04 LAB
BACTERIA SPEC AEROBE CULT: NORMAL
BACTERIA SPEC AEROBE CULT: NORMAL
CREAT BLD-MCNC: 1.17 MG/DL (ref 0.76–1.27)
GFR SERPL CREATININE-BSD FRML MDRD: 61 ML/MIN/1.73
GLUCOSE BLDC GLUCOMTR-MCNC: 122 MG/DL (ref 70–130)
GLUCOSE BLDC GLUCOMTR-MCNC: 131 MG/DL (ref 70–130)
GLUCOSE BLDC GLUCOMTR-MCNC: 138 MG/DL (ref 70–130)
VANCOMYCIN TROUGH SERPL-MCNC: 18.1 MCG/ML (ref 5–20)

## 2018-06-04 PROCEDURE — 97535 SELF CARE MNGMENT TRAINING: CPT

## 2018-06-04 PROCEDURE — 25010000002 VANCOMYCIN 10 G RECONSTITUTED SOLUTION: Performed by: INTERNAL MEDICINE

## 2018-06-04 PROCEDURE — 82962 GLUCOSE BLOOD TEST: CPT

## 2018-06-04 PROCEDURE — 80202 ASSAY OF VANCOMYCIN: CPT | Performed by: HOSPITALIST

## 2018-06-04 PROCEDURE — 25010000002 PIPERACILLIN SOD-TAZOBACTAM PER 1 G: Performed by: INTERNAL MEDICINE

## 2018-06-04 PROCEDURE — 82565 ASSAY OF CREATININE: CPT | Performed by: HOSPITALIST

## 2018-06-04 PROCEDURE — 97140 MANUAL THERAPY 1/> REGIONS: CPT

## 2018-06-04 RX ORDER — VIT E ACET/GLY/DIMETH/WATER
LOTION (ML) TOPICAL EVERY 12 HOURS SCHEDULED
Start: 2018-06-04

## 2018-06-04 RX ADMIN — FUROSEMIDE 20 MG: 20 TABLET ORAL at 08:49

## 2018-06-04 RX ADMIN — TAZOBACTAM SODIUM AND PIPERACILLIN SODIUM 3.38 G: 375; 3 INJECTION, SOLUTION INTRAVENOUS at 01:12

## 2018-06-04 RX ADMIN — VANCOMYCIN HYDROCHLORIDE 1250 MG: 10 INJECTION, POWDER, LYOPHILIZED, FOR SOLUTION INTRAVENOUS at 02:52

## 2018-06-04 RX ADMIN — EMOLLIENT - LOTION: LOTION at 08:51

## 2018-06-04 RX ADMIN — VANCOMYCIN HYDROCHLORIDE 1250 MG: 10 INJECTION, POWDER, LYOPHILIZED, FOR SOLUTION INTRAVENOUS at 14:13

## 2018-06-04 RX ADMIN — TAZOBACTAM SODIUM AND PIPERACILLIN SODIUM 3.38 G: 375; 3 INJECTION, SOLUTION INTRAVENOUS at 08:49

## 2018-06-04 RX ADMIN — AMLODIPINE BESYLATE 5 MG: 5 TABLET ORAL at 08:49

## 2018-06-04 RX ADMIN — ATORVASTATIN CALCIUM 40 MG: 20 TABLET, FILM COATED ORAL at 08:49

## 2018-06-04 NOTE — PROGRESS NOTES
"Pharmacokinetic Evaluation - Vancomycin    Don Verde Jr. is a 73 y.o. male on vancomycin pharmacy to dose.  MRN: [id number]  : 1945    Day of vancomycin therapy: 6  Indication: Skin/Soft tissue infection  Consulted by: Dr. Watson  Goal trough: 15-20    Current dose: 1500 mg IV Q12  Other antimicrobials: Zosyn 3.375    Blood pressure 135/74, pulse 55, temperature 98 °F (36.7 °C), temperature source Oral, resp. rate 14, height 180.3 cm (71\"), weight 108 kg (239 lb), SpO2 96 %.    Results from last 7 days  Lab Units 18  0138 18  0754 18  0556   CREATININE mg/dL 1.17 1.33* 1.27     Estimated Creatinine Clearance: 70.3 mL/min (by C-G formula based on SCr of 1.17 mg/dL).    Results from last 7 days  Lab Units 18  0752 18  0556 18  0750   WBC 10*3/mm3 8.51 8.40 9.87   HEMOGLOBIN g/dL 12.7* 12.9* 13.5*   HEMATOCRIT % 39.9* 38.6* 42.2   PLATELETS 10*3/mm3 286 297 357     Other relevant labs/chart info: Pt admitted with bleeding R foot; seen by vascular and underwent sclerosis on      Cultures:   : Blood cx NGTD    Dosing hx (include troughs if drawn):  : Vancomycin 2250 mg load given at 1415  : Vancomycin 1500 mg q12 maintenance dose started at 0258  : Vancomycin trough = 19.8 at 1329; dosing reduced to 1250 q12  : Vancomycin trough = 18.1 at 0138    Assessment:  Pt with improved Scr and level within therapeutic range. Continue with 1250 q12.    Plan:  1) Continue vancomycin 1250 mg every 12 hours.  2) No plan for further troughs or serum drug monitoring.   3) Monitor for s/sxns of vancomycin toxicity including changes in UOP/SCr; encourage hydration as tolerated to decrease risk of toxic accumulation.     Nanette De Paz, Pharm.D., [facility]  Oncology Pharmacy Specialist  [phone]      "

## 2018-06-04 NOTE — THERAPY TREATMENT NOTE
Acute Care - Occupational Therapy Treatment Note  [facility]     Patient Name: Don Verde Jr.  : 1945  MRN: [id number]  Today's Date: 2018  Onset of Illness/Injury or Date of Surgery: 18     Referring Physician: Mary Anne    Admit Date: 2018       ICD-10-CM ICD-9-CM   1. Open wound of left lower leg, initial encounter S81.802A 891.0   2. Left leg cellulitis L03.116 682.6   3. Self-care deficit for hygiene R46.0 V40.39   4. Asperger syndrome F84.5 299.80     Patient Active Problem List   Diagnosis   • Abnormal ECG   • Arteriosclerosis of coronary artery   • Essential hypertension   • Shortness of breath    • Open wound of left lower leg   • Venous insufficiency of both lower extremities   • Diabetes mellitus   • Asperger's syndrome   • Hyperkeratosis of skin   • Cellulitis of left lower extremity     Past Medical History:   Diagnosis Date   • Abnormal ECG    • Asperger's syndrome    • Diabetes mellitus    • Hyperlipidemia    • Hypertension      Past Surgical History:   Procedure Laterality Date   • APPENDECTOMY     • VEIN SURGERY         Therapy Treatment          Rehabilitation Treatment Summary     Row Name 18 1400             Treatment Time/Intention    Discipline occupational therapist  -VS      Document Type therapy note (daily note)  -VS      Subjective Information no complaints  -VS      Mode of Treatment concurrent therapy  -VS      Patient/Family Observations Pt. was in bed watching tv  -VS      Care Plan Review care plan/treatment goals reviewed;patient/other agree to care plan  -VS      Comment I though I was going to leave the hospital today?   -VS      Existing Precautions/Restrictions fall  -VS      Recorded by [VS] REAGAN Arteaga 18 1418      Row Name 18 1400             Cognitive Assessment/Intervention- PT/OT    Orientation Status (Cognition) oriented x 3  -VS      Follows Commands (Cognition) WFL  -VS      Recorded by [VS] REAGAN Arteaga  06/04/18 1418      Row Name 06/04/18 1400             Bed Mobility Assessment/Treatment    Bed Mobility Assessment/Treatment rolling right;supine-sit  -VS      Rolling Right [place] (Bed Mobility) conditional independence  -VS      Sit-Supine [place] (Bed Mobility) supervision  -VS      Comment (Bed Mobility) Supervision due to IV and IV pole  -VS      Recorded by [VS] Fay Mohan, OTR 06/04/18 1418      Row Name 06/04/18 1400             Functional Mobility    Functional Mobility- Ind. Level supervision required  -VS      Functional Mobility- Device --   THerapist pushed the IV pole to the bathroom with pt.  -VS      Functional Mobility-Distance (Feet) 15  -VS      Functional Mobility- Comment Pt. requires Supervision for safety only   -VS      Recorded by [VS] Fay Mohan, OTR 06/04/18 1418      Row Name 06/04/18 1400             Transfer Assessment/Treatment    Transfer Assessment/Treatment sit-stand transfer;stand-sit transfer;toilet transfer  -VS      Comment (Transfers) Supervision for safety only  -VS      Sit-Stand [place] (Transfers) supervision  -VS      Stand-Sit [place] (Transfers) supervision  -VS      [place] Level (Toilet Transfer) supervision  -VS      Assistive Device (Toilet Transfer) grab bars/safety frame  -VS      Recorded by [VS] Fay Mohan, OTR 06/04/18 1418      Row Name 06/04/18 1400             Toilet Transfer    Type (Toilet Transfer) stand pivot/stand step  -VS      Recorded by [VS] Fay Mohan, OTR 06/04/18 1418      Row Name 06/04/18 1400             General ROM    RT Upper Ext Comments  -VS      LT Upper Ext Comment  -VS      Recorded by [VS] Fay Mohan, OTR 06/04/18 1418      Row Name 06/04/18 1400             Right Upper Ext    Rt Upper Extremity Comments  AROM WFL  -VS      Recorded by [VS] Fay Mohan, OTR 06/04/18 1418      Row Name 06/04/18 1400             Left Upper Ext    Lt Upper Extremity Comments  AROM WFL  -VS       Recorded by [VS] Fay Mohan, OTR 06/04/18 1418      Row Name 06/04/18 1400             General Assessment (Manual Muscle Testing)    Comment, General Manual Muscle Testing (MMT) Assessment Functional to (A) with ADL skills   -VS      Recorded by [VS] Fay Mohan, OTR 06/04/18 1418      Row Name 06/04/18 1400             Positioning and Restraints    Pre-Treatment Position in bed  -VS      Post Treatment Position bathroom  -VS      Bathroom notified nsg;sitting;call light within reach;encouraged to call for assist  -VS      Recorded by [VS] Fay Mohan, OTR 06/04/18 1418      Row Name 06/04/18 1400             Pain Scale: Numbers Pre/Post-Treatment    Pain Scale: Numbers, Pretreatment 0/10 - no pain  -VS      Recorded by [VS] Fay Mohan, OTR 06/04/18 1418      Row Name 06/04/18 1400             Edema Assessment & Management    Location (Edema) lower extremity, left;lower extremity, right   Bathed and lotion applied, RN present to see (L) shin wound  -VS      Additional Documentation --   Stock[place]ette,Artiflex#10X1, !x8 & 2x10 Rosdial bandage (B)LY   -VS      Recorded by [VS] Fay Mohan, OTR 06/04/18 1418      Row Name                Wound 05/30/18 1800 Left leg unspecified    Wound - Properties Group Date first assessed: 05/30/18 [EB] Time first assessed: 1800 [EB] Present On Admission : yes;picture taken [EB] Side: Left [EB] Location: leg [EB] Type: unspecified [EB], cellulitis  Recorded by:  [EB] Jaja Kurtz RN 05/30/18 1938      User Key  (r) = Recorded By, (t) = Taken By, (c) = Cosigned By    Initials Name Effective Dates Discipline    VS REAGAN Arteaga 03/07/18 -  OT    LEROY Kurtz RN 11/21/17 -  Nurse        Wound 05/30/18 1800 Left leg unspecified (Active)   Dressing Appearance dry;intact 6/4/2018  8:45 AM   Closure ANDREW 6/4/2018  8:45 AM   Base dressing in place, unable to visualize 6/4/2018  8:45 AM   Drainage Characteristics/Odor serosanguineous 6/3/2018  4:44 PM    Drainage Amount small 6/3/2018  4:44 PM   Care, Wound cleansed with;soap and water 6/3/2018  4:44 PM   Dressing Care, Wound dressing changed 6/3/2018  4:44 PM         Occupational Therapy Education     Title: PT OT SLP Therapies (Done)     Topic: Occupational Therapy (Done)     Point: Precautions (Done)     Description: Instruct learner(s) on prescribed precautions during self-care and functional transfers.   Learning Progress Summary     Learner Status Readiness Method Response Comment Documented by    Patient Done PETER Butcher NR OT discussed safety concerns with ambulating with the IV with out assistance, Pt. is to ask for assistance before he ambulates to and from the bathroom. VS 06/04/18 1422     Done Acceptance PETER PIERCE,H BOSTON,NAOMY Reviewed bandage precautions, wearing schedule with pt. and nursing. Nursing to apply the bandages over the weekend. Issued handouts. Reviewed safety when ambulating to BR.  06/01/18 1435     Done PETER Butcher VU  VS 05/31/18 1642                      User Key     Initials Effective Dates Name Provider Type Discipline    VS 03/07/18 -  Fay Mohan OTR Occupational Therapist OT     03/07/18 -  Enedina Hurtado OTR Occupational Therapist OT                OT Recommendation and Plan  Outcome Summary/Treatment Plan (OT)  Anticipated Discharge Disposition (OT): skilled nursing facility, home with home health, long term acute care facility  Therapy Frequency (OT Eval): 5 times/wk  Outcome Summary: OT: Pt. requires supervision for safety with ADLs,  bed mobility and commode transfers no skilled needed for OT regarding ADLs and Transfer.  OT will contnine to address the LE edema and wrap the LEs Mon - Friday to (A) with decreasing the edema which will assist with overall ADL and mobility skills.        Outcome Measures     Row Name 06/04/18 1400             How much help from another is currently needed...    Putting on and taking off regular lower body clothing? 3  -VS      Bathing  (including washing, rinsing, and drying) 3  -VS      Toileting (which includes using toilet bed pan or urinal) 4  -VS      Putting on and taking off regular upper body clothing 4  -VS      Taking care of personal grooming (such as brushing teeth) 3  -VS      Eating meals 4  -VS      Score 21  -VS         Functional Assessment    Outcome Measure Options AM-PAC 6 Clicks Daily Activity (OT)  -VS        User Key  (r) = Recorded By, (t) = Taken By, (c) = Cosigned By    Initials Name Provider Type    VS REAGAN Arteaga Occupational Therapist           Time Calculation:         Time Calculation- OT     Row Name 06/04/18 1425             Time Calculation- OT    OT Start Time 1309  -VS      OT Stop Time 1351  -VS      OT Time Calculation (min) 42 min  -VS        User Key  (r) = Recorded By, (t) = Taken By, (c) = Cosigned By    Initials Name Provider Type    VS REAGAN Arteaga Occupational Therapist           Therapy Charges for Today     Code Description Service Date Service Provider Modifiers Qty    66273044699  OT SELF CARE/MGMT/TRAIN EA 15 MIN 6/4/2018 REAGAN Arteaga GO 1    [id number]  OT MANUAL THERAPY EA 15 MIN 6/4/2018 REAGAN Arteaga GO 2               REAGAN Arteaga  6/4/2018

## 2018-06-04 NOTE — PLAN OF CARE
Problem: Patient Care Overview  Goal: Plan of Care Review  Outcome: Ongoing (interventions implemented as appropriate)  Continue symptom management and comfort care   06/03/18 1743 06/03/18 2308 06/04/18 0410   Coping/Psychosocial   Plan of Care Reviewed With --  patient --    Plan of Care Review   Progress improving --  --    OTHER   Outcome Summary --  --  No change in patient condition. Bilateral legs wrapped. Wound care will be in during the AM. Pt rested well overnight. Ambulates to the restroom with minimal assist.     Goal: Individualization and Mutuality  Outcome: Ongoing (interventions implemented as appropriate)    Goal: Discharge Needs Assessment  Outcome: Ongoing (interventions implemented as appropriate)    Goal: Interprofessional Rounds/Family Conf  Outcome: Ongoing (interventions implemented as appropriate)      Problem: Skin and Soft Tissue Infection (Adult)  Goal: Signs and Symptoms of Listed Potential Problems Will be Absent, Minimized or Managed (Skin and Soft Tissue Infection)  Outcome: Ongoing (interventions implemented as appropriate)

## 2018-06-04 NOTE — DISCHARGE SUMMARY
[facility]    Date of Discharge:  6/4/2018    PCP: No Known Provider    Discharge Diagnosis:   Active Hospital Problems (** Indicates Principal Problem)    Diagnosis Date Noted   • Open wound of left lower leg [S81.802A] 05/30/2018   • Venous insufficiency of both lower extremities [I87.2] 05/30/2018   • Diabetes mellitus [E11.9] 05/30/2018   • Asperger's syndrome [F84.5] 05/30/2018   • Hyperkeratosis of skin [L85.9] 05/30/2018   • Cellulitis of left lower extremity [L03.116] 05/30/2018   • Essential hypertension [I10] 04/18/2018      Resolved Hospital Problems    Diagnosis Date Noted Date Resolved   No resolved problems to display.     Procedures Performed       Consults     Date and Time Order Name Status Description    5/30/2018 1651 Inpatient Vascular Surgery Consult Completed     5/30/2018 1341 [facility] (on-call MD unless specified) Completed         Hospital Course  Please see history and physical for details. Patient is a 73 y.o. male admitted for lower extremity issues which have been chronic as the patient has been noncompliant with follow-up as well as medication treatments in the past which is made worse by his Asperger's syndrome.  While here we kept the patient on IV vancomycin and Zosyn.  Ultimately we consulted vascular for additional assistance.  Dr Hearn performed an injection of sclerotx into a single vein over his right medial malleolus on 6/1/2018.  She performed vein mapping while here and ultimately has plans to proceed with ablation to augment healing.  Ultimately ablations were done in the office and she is coordinating outpatient follow-up.  She needs weekly wound care clinic follow-up.  Currently his legs are wrapped per vascular recommendations and walking is encouraged and there is no contraindication to physical therapy.  Thus far the anatomy has been felt good for the above ablation procedures when appropriate.  Other comorbidities  including diabetes and hypertension have both been well controlled.  We did keep him on Lovenox while here for DVT prophylaxis.  He's really voices no complaints on a daily basis.  I kept him through the weekend because I wanted to ensure that we were complete in regards to a safe discharge.  To help due to transportation issues we set this patient up with Dr. Benz [facility] will manage as PCP and those services also [facility].  An appointment is scheduled this week so no home health will be ordered on the interim.  I discussed the case with CCP on the day of discharge and apparently this patient already has a  through the state so ultimately that is who the patient will need to go through for further transportation issues.  At this juncture patient is medically stable for discharge and his brother who is his main caretaker plans to pick him up later this evening.  Apparently the brother is dealing with an active death involving his father-in-law which is pulling time away he normally defecates to this patient who otherwise lives completely independently.  All questions answered to the patient and is voicing no complaint at this juncture.      Condition on Discharge: Improved.     Temp:  [97 °F (36.1 °C)-98.4 °F (36.9 °C)] 97 °F (36.1 °C)  Heart Rate:  [51-55] 55  Resp:  [14-16] 16  BP: (116-135)/(71-77) 118/71  Body mass index is 33.33 kg/m².    Physical Exam   Constitutional: He appears well-developed and well-nourished.   Neck: No JVD present.   Cardiovascular: Normal rate and regular rhythm.    Pulmonary/Chest: Effort normal and breath sounds normal. No respiratory distress.   Abdominal: Soft. He exhibits no distension. There is no tenderness.   Musculoskeletal:   Continue use with bilateral leg wraps   Neurological: He is alert.   Psychiatric: He has a normal mood and affect. His behavior is normal.     Discharge Medications   Don Verde     Home Medication Instructions KIKE:[id number]    Printed on:06/04/18 5040   Medication Information                      amLODIPine (NORVASC) 5 MG tablet  Take 1 tablet by mouth Daily.             aspirin 81 MG tablet  Take 81 mg by mouth Daily.             atorvastatin (LIPITOR) 40 MG tablet  Take 40 mg by mouth Daily.             cetaphil (CETAPHIL) lotion  Apply  topically Every 12 (Twelve) Hours.             clopidogrel (PLAVIX) 75 MG tablet  TAKE 1 TABLET BY MOUTH DAILY.             furosemide (LASIX) 20 MG tablet  Take 20 mg by mouth Daily.                  Additional Instructions for the Follow-ups that You Need to Schedule     Discharge Follow-up with PCP    As directed      Follow Up Details:  Dr Demar jansen/ [facility] as scheduled - Vascular/Wound clinic/leg wrpaps w/ wound care per vascular            Contact information for follow-up providers     Marilyn Jaimes MD. Schedule an appointment as soon as possible for a visit in 1 week(s).    Specialty:  Podiatry  Why:  Make appointment for toenail trim and foot care  Contact information:  [address] [facility] [id number] [phone]             No Known Provider .    Why:  Dr Demar jansen/ [facility] Santana as scheduled - Vascular/Wound clinic/leg wrpaps w/ wound care per vascular  Contact information:  [facility] [id number] [phone]                   Contact information for after-discharge care     Home Medical Care     [facility] Follow up.    Specialty:  Home Health Services  Contact information:  [address]  [facility] [address] [phone]                           Test Results Pending at Discharge     Juan Miguel Rowe MD  06/04/18  2:47 PM    Discharge time spent greater than 30 minutes.

## 2018-06-04 NOTE — PLAN OF CARE
Problem: Patient Care Overview  Goal: Plan of Care Review   06/04/18 1418   OTHER   Outcome Summary OT: Pt. requires supervision for safety with ADLs, bed mobility and commode transfers no skilled needed for OT regarding ADLs and Transfer. OT will contnine to address the LE edema and wrap the LEs Mon - Friday to (A) with decreasing the edema which will assist with overall ADL and mobility skills.

## 2018-06-04 NOTE — PAYOR COMM NOTE
"UR CONTACT:   ANNIKA               P: [phone]  F: [phone]         Don Valente  (73 y.o. Male)     Date of Birth Social Security Number Address Home Phone MRN    1945  [address]  [facility] [address] [phone] [id number]    Z[employer] Marital Status          None Single       Admission Date Admission Type Admitting Provider Attending Provider Department, Room/Bed    5/30/18 Emergency Watson, MD Soledad Myles, Juan Miguel Kemp MD [address], P486/1    Discharge Date Discharge Disposition Discharge Destination                       Attending Provider:  Juan Miguel Rowe MD    Allergies:  No Known Drug Allergy    Isolation:  None   Infection:  None   Code Status:  FULL    Ht:  180.3 cm (71\")   Wt:  108 kg (239 lb)    Admission Cmt:  None   Principal Problem:  None                Active Insurance as of 5/30/2018     Primary Coverage     Payor Plan Insurance Group Employer/Plan Group    MEDICARE MEDICARE B ONLY      Payor Plan Address Payor Plan Phone Number Effective From Effective To    [address] [address] [phone] 3/1/2010     [facility] [id number]       Subscriber Name Subscriber Birth Date Member ID       DON VALENTE JR. 1945 [id number]           Secondary Coverage     Payor Plan Insurance Group Employer/Plan Group    PASSPORT PASSPORT MEDICAID     Payor Plan Address Payor Plan Phone Number Effective From Effective To    [address] [phone] 1/1/1998     [facility] [address]       Subscriber Name Subscriber Birth Date Member ID       DON VALENTE JR. 1945 [id number]                 Emergency Contacts      (Rel.) Home Phone Work Phone Mobile Phone    Momo Valente (Brother) -- -- [phone]    Essence Valente (Other) -- -- [phone]            Lines, Drains & Airways    Active LDAs     Name:   Placement date:   Placement time:   Site:   Days:    Peripheral IV 06/03/18 1358 Left Hand  06/03/18    1358    Hand    less than 1          " "           Physician Progress Notes      Juan Miguel Rowe MD at 6/3/2018 11:34 AM              DAILY PROGRESS NOTE  [facility]    Patient Identification:  Name: Don Verde Jr.  Age: 73 y.o.  Sex: male  :  1945  MRN: [id number]         Primary Care Physician: No Known Provider    Subjective:  Interval History: no new issues - denies cp/sob    Objective:no fm - d/w rn at      Scheduled Meds:  amLODIPine 5 mg Oral Daily   atorvastatin 40 mg Oral Daily   cetaphil  Topical Q12H   enoxaparin 40 mg Subcutaneous Nightly   furosemide 20 mg Oral Daily   insulin aspart 0-7 Units Subcutaneous 4x Daily With Meals & Nightly   piperacillin-tazobactam 3.375 g Intravenous Q8H   vancomycin 1,250 mg Intravenous Q12H     Continuous Infusions:  Pharmacy to dose vancomycin    Pharmacy to Dose Zosyn        Vital signs in last 24 hours:  Temp:  [97.1 °F (36.2 °C)-98.3 °F (36.8 °C)] 97.1 °F (36.2 °C)  Heart Rate:  [50-58] 57  Resp:  [16] 16  BP: (105-119)/(66-71) 119/71    Intake/Output:    Intake/Output Summary (Last 24 hours) at 18 1134  Last data filed at 18 1812   Gross per 24 hour   Intake          1274.48 ml   Output                0 ml   Net          1274.48 ml       Exam:  /71 (BP Location: Right arm, Patient Position: Lying)   Pulse 57   Temp 97.1 °F (36.2 °C) (Oral)   Resp 16   Ht 180.3 cm (71\")   Wt 108 kg (239 lb)   SpO2 96%   BMI 33.33 kg/m²      General Appearance:    Alert, cooperative, no distress, clinically stable                          Throat:   Lips, tongue, gums normal; oral mucosa pink and moist                         Lungs:    Clear to auscultation bilaterally, respirations unlabored                          Heart:    Regular rate and rhythm, S1 and S2 normal                  Abdomen:     Soft, non-tender, bowel sounds active                 Extremities:   Wraps                           Data Review:  Labs in chart were reviewed.    Assessment:  Active " Hospital Problems (** Indicates Principal Problem)    Diagnosis Date Noted   • Open wound of left lower leg [S81.802A] 05/30/2018   • Venous insufficiency of both lower extremities [I87.2] 05/30/2018   • Diabetes mellitus [E11.9] 05/30/2018   • Asperger's syndrome [F84.5] 05/30/2018   • Hyperkeratosis of skin [L85.9] 05/30/2018   • Cellulitis of left lower extremity [L03.116] 05/30/2018   • Essential hypertension [I10] 04/18/2018      Resolved Hospital Problems    Diagnosis Date Noted Date Resolved   No resolved problems to display.       Plan:  D4 Vanc/Zosyn - Vascular notes reviewed and appreciate ALL - remainder of Tx can be completed as outpt but we have CCP logistic issues to coordinate/complete. I d/w brother otp at [phone] and transportation is a big issue. PCP is now organized through [facility] but will need to have outpt  assistance and need further help w/ transportation - I will keep here until Monday so the above can be figured and we can get as safe a DC as possible for this man who still somehow lives independently o/w. For future reference, his Cardiologist is Dr Weir.      HTN - stable      DM2 - ssi      Asperger's       lovenox for DVT ppx     Juan Miguel Rowe MD  6/3/2018  11:34 AM      Electronically signed by Juan Miguel Rowe MD at 6/3/2018 11:36 AM

## 2018-06-05 NOTE — PAYOR COMM NOTE
"Don Verde  (73 y.o. Male)     Date of Birth Social Security Number Address Home Phone MRN    1945  [address]  [facility] [address] [phone] [id number]    Baptism Marital Status          None Single       Admission Date Admission Type Admitting Provider Attending Provider Department, Room/Bed    5/30/18 Emergency Denisha Watson MD  [facility] [address], P486/1    Discharge Date Discharge Disposition Discharge Destination        6/4/2018 Home or Self Care              Attending Provider:  (none)   Allergies:  No Known Drug Allergy    Isolation:  None   Infection:  None   Code Status:  Prior    Ht:  180.3 cm (71\")   Wt:  108 kg (239 lb)    Admission Cmt:  None   Principal Problem:  None                Active Insurance as of 5/30/2018     Primary Coverage     Payor Plan Insurance Group Employer/Plan Group    MEDICARE MEDICARE B ONLY      Payor Plan Address Payor Plan Phone Number Effective From Effective To    [address] [address] [phone] 3/1/2010     [facility] [address]       Subscriber Name Subscriber Birth Date Member ID       DON VERDE JR. 1945 [id number]           Secondary Coverage     Payor Plan Insurance Group Employer/Plan Group    PASSPORT PASSPORT MEDICAID     Payor Plan Address Payor Plan Phone Number Effective From Effective To    [address] [phone] 1/1/1998     [facility] [address]       Subscriber Name Subscriber Birth Date Member ID       DON VERDE JR. 1945 [id number]                 Emergency Contacts      (Rel.) Home Phone Work Phone Mobile Phone    Momo Verde (Brother) -- -- [phone]    Essence Verde (Other) -- -- [phone]            "

## 2018-06-05 NOTE — PROGRESS NOTES
Discharge Planning Assessment  [facility]     Patient Name: Don Verde Jr.  MRN: [id number]  Today's Date: 6/5/2018    Admit Date: 5/30/2018          Discharge Needs Assessment    No documentation.             Discharge Plan     Row Name 06/05/18 1224       Plan    Plan Comments Per Dr. Rowe the patient has an appointment with [facility] and they will order the home health. [facility] will follow up with [facility] to verify order for home health. JACKIE Hansen RN, CCP    Final Discharge Disposition Code 01 - home or self-care    Final Note Discharged to home on 6/4/18 @ 14:45. His brother will provide the transportation to home. JACKIE Hansen RN, CCP        Destination     No service coordination in this encounter.      Durable Medical Equipment     No service coordination in this encounter.      Dialysis/Infusion     No service coordination in this encounter.      Home Medical Care - Selection Complete     Service Request Status Selected Specialties Address Phone Number Fax Number    [facility] Selected Home Health Services [address] [address] [phone]      Social Care     No service coordination in this encounter.        Expected Discharge Date and Time     Expected Discharge Date Expected Discharge Time    Jun 4, 2018               Demographic Summary    No documentation.           Functional Status    No documentation.           Psychosocial    No documentation.           Abuse/Neglect    No documentation.           Legal    No documentation.           Substance Abuse    No documentation.           Patient Forms    No documentation.         Wanda Hansen RN

## 2018-06-05 NOTE — PROGRESS NOTES
Case Management Discharge Note    Final Note: Discharged to home on 6/4/18 @ 14:45. His brother will provide the transportation to home. JACKIE Hansen RN, CCP    Destination     No service coordination in this encounter.      Durable Medical Equipment     No service coordination in this encounter.      Dialysis/Infusion     No service coordination in this encounter.      Home Medical Care - Selection Complete     Service Request Status Selected Specialties Address Phone Number Fax Number    [facility] [place] Selected Home Health Services [address] [address] [phone]      Social Care     No service coordination in this encounter.        Other: Other (private auto/Brother)    Final Discharge Disposition Code: 01 - home or self-care

## 2018-06-06 RX ORDER — FUROSEMIDE 20 MG/1
TABLET ORAL
Qty: 30 TABLET | Refills: 0 | Status: SHIPPED | OUTPATIENT
Start: 2018-06-06

## 2018-06-06 RX ORDER — CLOPIDOGREL BISULFATE 75 MG/1
TABLET ORAL
Qty: 30 TABLET | Refills: 0 | Status: SHIPPED | OUTPATIENT
Start: 2018-06-06 | End: 2018-07-06 | Stop reason: SDUPTHER

## 2018-06-14 ENCOUNTER — APPOINTMENT (OUTPATIENT)
Dept: CARDIOLOGY | Facility: HOSPITAL | Age: 73
End: 2018-06-14
Attending: INTERNAL MEDICINE

## 2018-07-06 RX ORDER — CLOPIDOGREL BISULFATE 75 MG/1
TABLET ORAL
Qty: 30 TABLET | Refills: 1 | Status: SHIPPED | OUTPATIENT
Start: 2018-07-06 | End: 2018-09-25 | Stop reason: SDUPTHER

## 2018-08-07 RX ORDER — ATORVASTATIN CALCIUM 40 MG/1
40 TABLET, FILM COATED ORAL DAILY
Qty: 90 TABLET | Refills: 0 | Status: SHIPPED | OUTPATIENT
Start: 2018-08-07

## 2018-09-25 RX ORDER — CLOPIDOGREL BISULFATE 75 MG/1
75 TABLET ORAL DAILY
Qty: 30 TABLET | Refills: 1 | Status: SHIPPED | OUTPATIENT
Start: 2018-09-25 | End: 2018-12-08 | Stop reason: SDUPTHER

## 2018-12-11 RX ORDER — CLOPIDOGREL BISULFATE 75 MG/1
75 TABLET ORAL DAILY
Qty: 30 TABLET | Refills: 0 | Status: SHIPPED | OUTPATIENT
Start: 2018-12-11 | End: 2019-01-06 | Stop reason: SDUPTHER

## 2019-01-07 RX ORDER — CLOPIDOGREL BISULFATE 75 MG/1
75 TABLET ORAL DAILY
Qty: 30 TABLET | Refills: 0 | Status: SHIPPED | OUTPATIENT
Start: 2019-01-07
